# Patient Record
Sex: FEMALE | Race: WHITE | NOT HISPANIC OR LATINO | Employment: FULL TIME | URBAN - METROPOLITAN AREA
[De-identification: names, ages, dates, MRNs, and addresses within clinical notes are randomized per-mention and may not be internally consistent; named-entity substitution may affect disease eponyms.]

---

## 2017-06-02 ENCOUNTER — AMBULATORY - HEALTHEAST (OUTPATIENT)
Dept: LAB | Facility: HOSPITAL | Age: 32
End: 2017-06-02

## 2017-06-02 DIAGNOSIS — R53.83 FATIGUE: ICD-10-CM

## 2017-06-02 DIAGNOSIS — G25.81 RESTLESS LEGS: ICD-10-CM

## 2017-09-22 ENCOUNTER — OFFICE VISIT - HEALTHEAST (OUTPATIENT)
Dept: FAMILY MEDICINE | Facility: CLINIC | Age: 32
End: 2017-09-22

## 2017-09-22 DIAGNOSIS — L03.90 CELLULITIS: ICD-10-CM

## 2017-11-29 ENCOUNTER — AMBULATORY - HEALTHEAST (OUTPATIENT)
Dept: NURSING | Facility: CLINIC | Age: 32
End: 2017-11-29

## 2017-11-29 DIAGNOSIS — Z23 NEED FOR VACCINATION: ICD-10-CM

## 2017-12-13 ENCOUNTER — RECORDS - HEALTHEAST (OUTPATIENT)
Dept: ADMINISTRATIVE | Facility: OTHER | Age: 32
End: 2017-12-13

## 2017-12-18 ENCOUNTER — HOSPITAL ENCOUNTER (OUTPATIENT)
Dept: RADIOLOGY | Facility: CLINIC | Age: 32
Discharge: HOME | End: 2017-12-18

## 2017-12-18 DIAGNOSIS — N93.9 VAGINAL BLEEDING, ABNORMAL: ICD-10-CM

## 2017-12-18 DIAGNOSIS — N92.6 ABNORMAL BLEEDING IN MENSTRUAL CYCLE: ICD-10-CM

## 2018-02-28 ENCOUNTER — COMMUNICATION - HEALTHEAST (OUTPATIENT)
Dept: FAMILY MEDICINE | Facility: CLINIC | Age: 33
End: 2018-02-28

## 2018-03-01 ENCOUNTER — OFFICE VISIT - HEALTHEAST (OUTPATIENT)
Dept: FAMILY MEDICINE | Facility: CLINIC | Age: 33
End: 2018-03-01

## 2018-03-01 DIAGNOSIS — Z32.00 POSSIBLE PREGNANCY: ICD-10-CM

## 2018-03-01 LAB — HCG UR QL: POSITIVE

## 2018-03-02 ENCOUNTER — AMBULATORY - HEALTHEAST (OUTPATIENT)
Dept: LAB | Facility: HOSPITAL | Age: 33
End: 2018-03-02

## 2018-03-02 DIAGNOSIS — N89.8 VAGINAL DISCHARGE IN PREGNANCY, FIRST TRIMESTER: ICD-10-CM

## 2018-03-02 DIAGNOSIS — O26.891 VAGINAL DISCHARGE IN PREGNANCY, FIRST TRIMESTER: ICD-10-CM

## 2018-03-02 LAB
HCG SERPL-ACNC: 72 MLU/ML (ref 0–4)
PROGEST SERPL-MCNC: 33.3 NG/ML

## 2018-03-05 ENCOUNTER — AMBULATORY - HEALTHEAST (OUTPATIENT)
Dept: LAB | Facility: HOSPITAL | Age: 33
End: 2018-03-05

## 2018-03-05 DIAGNOSIS — O26.891 VAGINAL DISCHARGE IN PREGNANCY, FIRST TRIMESTER: ICD-10-CM

## 2018-03-05 DIAGNOSIS — N89.8 VAGINAL DISCHARGE IN PREGNANCY, FIRST TRIMESTER: ICD-10-CM

## 2018-03-05 LAB
HCG SERPL-ACNC: 209 MLU/ML (ref 0–4)
PROGEST SERPL-MCNC: 21.7 NG/ML

## 2018-03-19 ENCOUNTER — AMBULATORY - HEALTHEAST (OUTPATIENT)
Dept: LAB | Facility: HOSPITAL | Age: 33
End: 2018-03-19

## 2018-03-19 DIAGNOSIS — O26.891 VAGINAL DISCHARGE IN PREGNANCY, FIRST TRIMESTER: ICD-10-CM

## 2018-03-19 DIAGNOSIS — N89.8 VAGINAL DISCHARGE IN PREGNANCY, FIRST TRIMESTER: ICD-10-CM

## 2018-03-19 LAB — PROGEST SERPL-MCNC: 15.2 NG/ML

## 2018-03-23 ENCOUNTER — HOSPITAL ENCOUNTER (OUTPATIENT)
Dept: ULTRASOUND IMAGING | Facility: HOSPITAL | Age: 33
Discharge: HOME | End: 2018-03-23
Attending: FAMILY MEDICINE

## 2018-03-23 DIAGNOSIS — Z32.00 POSSIBLE PREGNANCY: ICD-10-CM

## 2018-03-29 ENCOUNTER — COMMUNICATION - HEALTHEAST (OUTPATIENT)
Dept: SCHEDULING | Facility: CLINIC | Age: 33
End: 2018-03-29

## 2018-04-03 ENCOUNTER — HOSPITAL ENCOUNTER (OUTPATIENT)
Dept: ULTRASOUND IMAGING | Facility: HOSPITAL | Age: 33
Discharge: HOME | End: 2018-04-03
Attending: FAMILY MEDICINE

## 2018-04-03 ENCOUNTER — RECORDS - HEALTHEAST (OUTPATIENT)
Dept: ADMINISTRATIVE | Facility: OTHER | Age: 33
End: 2018-04-03

## 2018-04-03 DIAGNOSIS — O20.9 VAGINAL BLEEDING IN PREGNANCY, FIRST TRIMESTER: ICD-10-CM

## 2018-09-08 ENCOUNTER — AMBULATORY - HEALTHEAST (OUTPATIENT)
Dept: LAB | Facility: HOSPITAL | Age: 33
End: 2018-09-08

## 2018-09-08 DIAGNOSIS — E28.8 OTHER OVARIAN DYSFUNCTION: ICD-10-CM

## 2018-09-08 LAB
ESTRADIOL SERPL-MCNC: 89 PG/ML
PROGEST SERPL-MCNC: 18 NG/ML

## 2018-09-29 ENCOUNTER — AMBULATORY - HEALTHEAST (OUTPATIENT)
Dept: LAB | Facility: HOSPITAL | Age: 33
End: 2018-09-29

## 2018-09-29 DIAGNOSIS — E28.2 POLYCYSTIC OVARIES: ICD-10-CM

## 2018-09-29 LAB — ESTRADIOL SERPL-MCNC: 87 PG/ML

## 2018-11-23 ENCOUNTER — AMBULATORY - HEALTHEAST (OUTPATIENT)
Dept: LAB | Facility: HOSPITAL | Age: 33
End: 2018-11-23

## 2018-11-23 DIAGNOSIS — Z87.59 HX OF ONE MISCARRIAGE: ICD-10-CM

## 2018-11-23 LAB — HCG SERPL-ACNC: 309 MLU/ML (ref 0–4)

## 2018-12-14 ENCOUNTER — RECORDS - HEALTHEAST (OUTPATIENT)
Dept: ADMINISTRATIVE | Facility: OTHER | Age: 33
End: 2018-12-14

## 2018-12-17 ENCOUNTER — AMBULATORY - HEALTHEAST (OUTPATIENT)
Dept: LAB | Facility: HOSPITAL | Age: 33
End: 2018-12-17

## 2018-12-17 DIAGNOSIS — Z87.59 HISTORY OF MISCARRIAGE: ICD-10-CM

## 2018-12-17 LAB
HCG SERPL-ACNC: ABNORMAL MLU/ML (ref 0–4)
PROGEST SERPL-MCNC: 31.4 NG/ML

## 2018-12-18 ENCOUNTER — HOSPITAL ENCOUNTER (OUTPATIENT)
Dept: ULTRASOUND IMAGING | Facility: HOSPITAL | Age: 33
Discharge: HOME | End: 2018-12-18
Attending: PHYSICIAN ASSISTANT

## 2018-12-18 DIAGNOSIS — O36.5990 SMALL FOR DATES AFFECTING MANAGEMENT OF MOTHER: ICD-10-CM

## 2018-12-18 DIAGNOSIS — O20.0 THREATENED MISCARRIAGE: ICD-10-CM

## 2019-02-25 ASSESSMENT — MIFFLIN-ST. JEOR: SCORE: 1605.33

## 2019-02-27 ENCOUNTER — SURGERY - HEALTHEAST (OUTPATIENT)
Dept: SURGERY | Facility: HOSPITAL | Age: 34
End: 2019-02-27

## 2019-02-27 ENCOUNTER — ANESTHESIA - HEALTHEAST (OUTPATIENT)
Dept: SURGERY | Facility: HOSPITAL | Age: 34
End: 2019-02-27

## 2019-08-16 ASSESSMENT — MIFFLIN-ST. JEOR: SCORE: 1600.8

## 2019-08-19 ENCOUNTER — SURGERY - HEALTHEAST (OUTPATIENT)
Dept: SURGERY | Facility: HOSPITAL | Age: 34
End: 2019-08-19

## 2019-08-19 ENCOUNTER — ANESTHESIA - HEALTHEAST (OUTPATIENT)
Dept: SURGERY | Facility: HOSPITAL | Age: 34
End: 2019-08-19

## 2020-10-12 ENCOUNTER — AMBULATORY - HEALTHEAST (OUTPATIENT)
Dept: LAB | Facility: HOSPITAL | Age: 35
End: 2020-10-12

## 2020-10-12 DIAGNOSIS — O26.891 VAGINAL DISCHARGE IN PREGNANCY, FIRST TRIMESTER: ICD-10-CM

## 2020-10-12 DIAGNOSIS — Z32.01 PREGNANCY EXAMINATION OR TEST, POSITIVE RESULT: ICD-10-CM

## 2020-10-12 DIAGNOSIS — N89.8 VAGINAL DISCHARGE IN PREGNANCY, FIRST TRIMESTER: ICD-10-CM

## 2020-10-12 LAB — HCG SERPL-ACNC: 1800 MLU/ML (ref 0–4)

## 2021-05-31 VITALS — WEIGHT: 184.7 LBS | BODY MASS INDEX: 28.08 KG/M2

## 2021-05-31 NOTE — ANESTHESIA CARE TRANSFER NOTE
Last vitals:   Vitals:    08/19/19 1319   BP: 117/64   Pulse: 79   Resp: 18   Temp: 37.1  C (98.7  F)   SpO2: 98%     Patient's level of consciousness is awake  Spontaneous respirations: yes  Maintains airway independently: yes  Dentition unchanged: yes  Oropharynx: oropharynx clear of all foreign objects    QCDR Measures:  ASA# 20 - Surgical Safety Checklist: WHO surgical safety checklist completed prior to induction    PQRS# 430 - Adult PONV Prevention: 4558F - Pt received => 2 anti-emetic agents (different classes) preop & intraop  ASA# 8 - Peds PONV Prevention: NA - Not pediatric patient, not GA or 2 or more risk factors NOT present  PQRS# 424 - Cher-op Temp Management: 4559F - At least one body temp DOCUMENTED => 35.5C or 95.9F within required timeframe  PQRS# 426 - PACU Transfer Protocol: - Transfer of care checklist used  ASA# 14 - Acute Post-op Pain: ASA14B - Patient did NOT experience pain >= 7 out of 10

## 2021-05-31 NOTE — ANESTHESIA POSTPROCEDURE EVALUATION
Patient: Mary Carlin  SUCTION CURETTAGE, UTERINE CULTURE  Anesthesia type: MAC    Patient location: P-8  Last vitals:   Vitals Value Taken Time   /63 8/19/2019  1:20 PM   Temp 37.1  C (98.7  F) 8/19/2019  1:19 PM   Pulse 73 8/19/2019  1:34 PM   Resp 14 8/19/2019  1:34 PM   SpO2 100 % 8/19/2019  1:34 PM   Vitals shown include unvalidated device data.  Post vital signs: stable  Level of consciousness: alert, conversing and drinking  Post-anesthesia pain: pain controlled  Post-anesthesia nausea and vomiting: no  Pulmonary: room air  Cardiovascular: stable and blood pressure at baseline  Hydration: adequate  Anesthetic events: no    QCDR Measures:  ASA# 11 - Cher-op Cardiac Arrest: ASA11B - Patient did NOT experience unanticipated cardiac arrest  ASA# 12 - Cher-op Mortality Rate: ASA12B - Patient did NOT die  ASA# 13 - PACU Re-Intubation Rate: ASA13B - Patient did NOT require a new airway mgmt  ASA# 10 - Composite Anes Safety: ASA10A - No serious adverse event    Additional Notes:

## 2021-05-31 NOTE — ANESTHESIA PREPROCEDURE EVALUATION
Anesthesia Evaluation      Patient summary reviewed   History of anesthetic complications     Airway   Mallampati: II  Neck ROM: full   Pulmonary - negative ROS and normal exam                          Cardiovascular - negative ROS and normal exam  Exercise tolerance: > or = 4 METS   Neuro/Psych    (+) depression, anxiety/panic attacks,     Endo/Other - negative ROS      GI/Hepatic/Renal - negative ROS      Other findings: Missed AB. Had post-op nausea after laparoscopic surgery.  Strong motion sickness hx.        Dental - normal exam                        Anesthesia Plan  Planned anesthetic: MAC    ASA 2     Anesthetic plan and risks discussed with: patient  Anesthesia plan special considerations: antiemetics,   Post-op plan: routine recovery

## 2021-06-01 VITALS — WEIGHT: 188.2 LBS | BODY MASS INDEX: 28.62 KG/M2

## 2021-06-02 VITALS — WEIGHT: 190 LBS | BODY MASS INDEX: 28.79 KG/M2 | HEIGHT: 68 IN

## 2021-06-03 ENCOUNTER — RECORDS - HEALTHEAST (OUTPATIENT)
Dept: ADMINISTRATIVE | Facility: OTHER | Age: 36
End: 2021-06-03

## 2021-06-03 VITALS — HEIGHT: 68 IN | BODY MASS INDEX: 28.64 KG/M2 | WEIGHT: 189 LBS

## 2021-06-05 ENCOUNTER — RECORDS - HEALTHEAST (OUTPATIENT)
Dept: FAMILY MEDICINE | Facility: CLINIC | Age: 36
End: 2021-06-05

## 2021-06-05 DIAGNOSIS — Z36.89 ENCOUNTER FOR FETAL ANATOMIC SURVEY: ICD-10-CM

## 2021-06-05 DIAGNOSIS — Z33.1 PREGNANT STATE, INCIDENTAL: ICD-10-CM

## 2021-06-08 ENCOUNTER — RECORDS - HEALTHEAST (OUTPATIENT)
Dept: ADMINISTRATIVE | Facility: OTHER | Age: 36
End: 2021-06-08

## 2021-06-10 ENCOUNTER — RECORDS - HEALTHEAST (OUTPATIENT)
Dept: ADMINISTRATIVE | Facility: OTHER | Age: 36
End: 2021-06-10

## 2021-06-11 ENCOUNTER — ANESTHESIA - HEALTHEAST (OUTPATIENT)
Dept: OBGYN | Facility: HOSPITAL | Age: 36
End: 2021-06-11

## 2021-06-13 NOTE — PROGRESS NOTES
Chief Complaint   Patient presents with     Facial Pain     rash/bump abover R eyebrow area getting larger and painful causing eye to hurt, 2nd area side of R face         History of Present Illness: Nursing notes reviewed. Patient first noted a tender area of swelling above hr right eyebrow about 5 days ago, with it starting to turn red in this area about 3 days ago. About 5 days ago, she also noted a small tender area near her right ear lobe. No recent injury or fever. No recent visual problems. She feels a little right nasal congestion.     Review of systems: See history of present illness, otherwise negative.     Current Outpatient Prescriptions   Medication Sig Dispense Refill     cholecalciferol, vitamin D3, (VITAMIN D3) 2,000 unit Tab Take by mouth.       cyanocobalamin (VITAMIN B-12) 500 MCG tablet Take 1,000 mcg by mouth daily.       escitalopram oxalate (LEXAPRO) 10 MG tablet Take 10 mg by mouth daily.       magnesium 30 mg tablet Take 30 mg by mouth 2 (two) times a day.       metFORMIN (GLUMETZA) 500 MG (MOD) 24 hr tablet Take 500 mg by mouth daily with breakfast.       NALTREXONE, BULK, MISC Use 3 mg As Directed.       PNV CMB#21/IRON/FOLIC ACID (PRENATAL COMPLETE ORAL) Take by mouth.       pyridoxine, vitamin B6, (VITAMIN B-6) 25 MG tablet Take 25 mg by mouth daily.       busPIRone (BUSPAR) 10 MG tablet Take 1 tablet (10 mg total) by mouth 2 (two) times a day. 60 tablet 2     cephalexin (KEFLEX) 500 MG capsule Take 1 capsule (500 mg total) by mouth 4 (four) times a day for 10 days. 40 capsule 0     sertraline (ZOLOFT) 100 MG tablet Take 1.5 tablets (150 mg total) by mouth daily. 135 tablet 3     No current facility-administered medications for this visit.        Past Medical History:   Diagnosis Date     Irregular menstrual cycle      Mental disorder     hx of anxiety prior to pregnancy. Off zoloft prior to pregnancy     Other and unspecified ovarian cyst      Other malaise and fatigue      Pregnant  state, incidental       Past Surgical History:   Procedure Laterality Date     TONSILLECTOMY       TONSILLECTOMY Bilateral     age 6      Social History     Social History     Marital status:      Spouse name: N/A     Number of children: N/A     Years of education: N/A     Social History Main Topics     Smoking status: Never Smoker     Smokeless tobacco: Never Used     Alcohol use No     Drug use: No     Sexual activity: Yes     Partners: Male     Other Topics Concern     None     Social History Narrative       History   Smoking Status     Never Smoker   Smokeless Tobacco     Never Used      Exam:   Blood pressure 112/62, pulse 77, temperature 98.7  F (37.1  C), resp. rate 12, weight 184 lb 11.2 oz (83.8 kg), SpO2 100 %, not currently breastfeeding.    EXAM:   General: Vital signs reviewed. Patient is in no acute appearing distress. Breathing is non labored appearing. Patient is alert and oriented x 3.   ENT: Tympanic membranes are clear and without injection bilaterally, nasal turbinates show no injection or rhinorrhea, no pharyngeal injection or exudate.  Just above patient's right eyebrow, patient has a tender firm 2 cm area of erythema, with no skin surface changes. Patient has a tender lymph node just anterior to the right ear lobe.  Neck: No adenopathy, with the exception of solitary lymph node just anterior to the right earlobe.    Assessment/Plan   1. Cellulitis  cephalexin (KEFLEX) 500 MG capsule       There are no Patient Instructions on file for this visit.   Dickson Raza DO

## 2021-06-16 PROBLEM — N96 RECURRENT PREGNANCY LOSS: Status: ACTIVE | Noted: 2021-06-11

## 2021-06-16 PROBLEM — Z3A.39 39 WEEKS GESTATION OF PREGNANCY: Status: ACTIVE | Noted: 2021-06-11

## 2021-06-16 PROBLEM — F32.A DEPRESSION: Status: ACTIVE | Noted: 2021-06-11

## 2021-06-16 NOTE — PROGRESS NOTES
Assessment & Plan:  1. Possible pregnancy  Pt with positive pregnancy test.  She was given folder and questions answered      Counseled patient on early pregnancy issues and plans for continued pregnancy:  - OTC meds safe in early pregnancy,  - importance of prenatal vitamins, and routine activities without restrictions.    - importance of ETOH abstinence and no other drug use  Informed of signs and symptoms of miscarriage and to call with questions of spotting/bleeding management and possible need to come in for evaluation before routine visit at 10-12 weeks.   Set 30 minute visit at 12 weeks pregnant.  Discussed information in the OB packet.  Will let us know if early genetic testing wanted.  Can call for script of Phenergan or Reglan if needed for nausea.  Early ultrasound if needed or wishes, can check with insurance coverage.        - Pregnancy, Urine  - US OB < 14 Weeks With Transvaginal; Future        Orders Placed This Encounter   Procedures     US OB < 14 Weeks With Transvaginal     Standing Status:   Future     Standing Expiration Date:   3/2/2019     Scheduling Instructions:      SCHEDULING: Will patient schedule appointment? Yes     LOCATION: Pt preference            LOCATIONS:        - [facility] ([facility]), PHONE: [phone]      - [facility] ([facility] [place]), PHONE: [phone]     Order Specific Question:   Reason for Exam (Describe Symptoms):     Answer:   dating us - unsure LMP - 1/14/19     Order Specific Question:   Is the patient pregnant?     Answer:   Yes     Order Specific Question:   Can the procedure be changed per Radiologist protocol?     Answer:   Yes     Pregnancy, Urine     Medications Discontinued During This Encounter   Medication Reason     busPIRone (BUSPAR) 10 MG tablet Therapy completed     sertraline (ZOLOFT) 100 MG tablet Therapy completed       No Follow-up on file.    Chief Complaint:   Chief Complaint   Patient presents with     Possible Pregnancy      missed period on 02/28/18-positive pregnancy test on monday 02/26/18       History of Present Illness:  Mary is a 32 y.o. female presenting to the clinic today for a pregnancy confirmation. This pregnancy is planned and desired, as her and her  have been trying for 8 months. She has had 6 positive at-home tests. She missed her period on 2/28/18, but has irregular cycles. Her last cycle was 1/14/18 and she tested positive test on 2/26/18. She did not have complications with her last pregnancy other than weight gain. She does not plan on genetic screening. She is taking prenatal vitamins. She has not been feeling nauseous, but feels dizzy in the evenings. She is taking lexipro. She has 6 sets of twins on her mom's side.     Review of Systems:  All other systems are negative.     PFSH:  She has one son, Agusto. She is a .     Tobacco Use:  History   Smoking Status     Never Smoker   Smokeless Tobacco     Never Used       Vitals:  Vitals:    03/01/18 1255   BP: 102/76   Patient Site: Right Arm   Patient Position: Sitting   Cuff Size: Adult Regular   Pulse: 88   Resp: 16   Temp: 98.3  F (36.8  C)   TempSrc: Oral   Weight: 188 lb 3.2 oz (85.4 kg)     Wt Readings from Last 3 Encounters:   03/01/18 188 lb 3.2 oz (85.4 kg)   09/22/17 184 lb 11.2 oz (83.8 kg)   05/13/16 194 lb 1.3 oz (88 kg)     Body mass index is 28.62 kg/(m^2).      Physical Exam:  Constitutional:  Reveals an alert, cooperative, pleasant female in no acute distress.  Vitals:  Per nursing notes.  Cardiac:  Regular rate and rhythm without murmurs, rubs, or gallops.   Lungs: Clear.  Respiratory effort normal.  Neurologic:  No gross focal deficits.    Psychiatric:  Mood appropriate, memory intact.     Data Reviewed:  Additional history summarized (from old records or history from someone other than the patient or another healthcare provider) (2 TOTAL): None.     Decision to obtain extra information (old records requested or history from  another person or accessing Care Everywhere) (1 TOTAL): None.     Radiology tests summarized or ordered (XRAY/CT/MRI/DXA) (1 TOTAL): Ordered US.     Labs reviewed or ordered (1 TOTAL): Reviewed and ordered labs.     Medicine tests summarized or ordered (EKG/ECHO) (1 TOTAL): None.    Independent review of EKG or X-Ray (2 EACH): None.       The visit lasted a total of 26 minutes face to face with the patient. Over 50% of the time was spent counseling and educating the patient about pregnancy.    I, Andressa Penaloza, am scribing for and in the presence of, Dr. Youngblood.    IShakira MD, personally performed the services described in this documentation, as scribed by Andressa Penaloza in my presence, and it is both accurate and complete.    Medications:  Current Outpatient Prescriptions   Medication Sig Dispense Refill     cholecalciferol, vitamin D3, (VITAMIN D3) 2,000 unit Tab Take by mouth.       escitalopram oxalate (LEXAPRO) 10 MG tablet Take 10 mg by mouth daily.       magnesium 30 mg tablet Take 30 mg by mouth 2 (two) times a day.       metFORMIN (GLUMETZA) 500 MG (MOD) 24 hr tablet Take 500 mg by mouth daily with breakfast.       PNV CMB#21/IRON/FOLIC ACID (PRENATAL COMPLETE ORAL) Take by mouth.       progesterone (PROMETRIUM) 200 MG capsule USE 2 CS VAGINALLY QHS FOR 10 DAYS EACH CYCLE. START PEAK PLUS 3  3     cyanocobalamin (VITAMIN B-12) 500 MCG tablet Take 1,000 mcg by mouth daily.       NALTREXONE, BULK, MISC Use 3 mg As Directed.       pyridoxine, vitamin B6, (VITAMIN B-6) 25 MG tablet Take 25 mg by mouth daily.       No current facility-administered medications for this visit.        Total Data Points: 2

## 2021-06-24 NOTE — ANESTHESIA POSTPROCEDURE EVALUATION
Patient: Mary Carlin  HYSTEROSCOPY, DILATATION AND CURETTAGE, UTERINE CULTURE, LAPAROSCOPY WITH TUBAL DYE STUDY. FULGURATION OF ENDOMETRIOSIS  Anesthesia type: general    Patient location: PACU  Last vitals:   Vitals:    02/27/19 1720   BP: 113/57   Pulse: 71   Resp: 10   Temp:    SpO2: 98%     Post vital signs: stable  Level of consciousness: awake and responds to simple questions  Post-anesthesia pain: pain controlled  Post-anesthesia nausea and vomiting: no  Pulmonary: unassisted, return to baseline  Cardiovascular: stable and blood pressure at baseline  Hydration: adequate  Anesthetic events: no    QCDR Measures:  ASA# 11 - Cher-op Cardiac Arrest: ASA11B - Patient did NOT experience unanticipated cardiac arrest  ASA# 12 - Cher-op Mortality Rate: ASA12B - Patient did NOT die  ASA# 13 - PACU Re-Intubation Rate: ASA13B - Patient did NOT require a new airway mgmt  ASA# 10 - Composite Anes Safety: ASA10A - No serious adverse event    Additional Notes:

## 2021-06-24 NOTE — ANESTHESIA PREPROCEDURE EVALUATION
Anesthesia Evaluation      Patient summary reviewed     Airway   Mallampati: II  Neck ROM: full   Pulmonary - negative ROS and normal exam                          Cardiovascular - negative ROS  Rhythm: regular  Rate: normal,         Neuro/Psych      Endo/Other - negative ROS      GI/Hepatic/Renal - negative ROS           Dental - normal exam                        Anesthesia Plan  Planned anesthetic: general endotracheal  Tiva, ketamine  ASA 1   Induction: intravenous       Post-op plan: routine recovery

## 2021-06-24 NOTE — ANESTHESIA CARE TRANSFER NOTE
Last vitals:   Vitals:    02/27/19 1650   BP: 113/60   Pulse: (!) 101   Resp: 18   Temp: 37.2  C (98.9  F)   SpO2: 100%     Patient's level of consciousness is drowsy  Spontaneous respirations: yes  Maintains airway independently: yes  Dentition unchanged: yes  Oropharynx: oropharynx clear of all foreign objects    QCDR Measures:  ASA# 20 - Surgical Safety Checklist: WHO surgical safety checklist completed prior to induction    PQRS# 430 - Adult PONV Prevention: 4558F - Pt received => 2 anti-emetic agents (different classes) preop & intraop  ASA# 8 - Peds PONV Prevention: NA - Not pediatric patient, not GA or 2 or more risk factors NOT present  PQRS# 424 - Cher-op Temp Management: 4559F - At least one body temp DOCUMENTED => 35.5C or 95.9F within required timeframe  PQRS# 426 - PACU Transfer Protocol: - Transfer of care checklist used  ASA# 14 - Acute Post-op Pain: ASA14B - Patient did NOT experience pain >= 7 out of 10

## 2021-06-26 ENCOUNTER — HEALTH MAINTENANCE LETTER (OUTPATIENT)
Age: 36
End: 2021-06-26

## 2021-06-26 NOTE — ANESTHESIA PREPROCEDURE EVALUATION
Anesthesia Evaluation      Patient summary reviewed   No history of anesthetic complications     Airway   Mallampati: II   Pulmonary - negative ROS and normal exam                          Cardiovascular - negative ROS and normal exam  Rhythm: regular  Rate: normal,         Neuro/Psych - negative ROS     Endo/Other - negative ROS   (+) pregnant     GI/Hepatic/Renal - negative ROS           Dental - normal exam                        Anesthesia Plan  Planned anesthetic: epidural    ASA 2     Anesthetic plan and risks discussed with: patient and spouse    Post-op plan: routine recovery

## 2021-06-26 NOTE — ANESTHESIA PROCEDURE NOTES
Epidural Block    Patient location during procedure: OB  Time Called: 6/11/2021 6:08 PM  Reason for Block:labor epidural  Staffing:  Performing  Anesthesiologist: Colin Richmond MD  Preanesthetic Checklist  Completed: patient identified, risks, benefits, and alternatives discussed, timeout performed, consent obtained, at patient's request, airway assessed, oxygen available, suction available, emergency drugs available and hand hygiene performed  Procedure  Patient position: sitting  Prep: ChloraPrep  Patient monitoring: continuous pulse oximetry  Approach: midline  Location: L3-L4  Injection technique: ALEKSANDER saline  Number of Attempts:1  Needle  Needle type: Lianna   Needle gauge: 18 G     Catheter in Space: 5  Assessment  Sensory level: T8  No complications      Additional Notes:  Called to patient's room for epidural  Consent obtained  Timeput performed  RN at bedside during procedure

## 2021-06-26 NOTE — ANESTHESIA POSTPROCEDURE EVALUATION
"Patient: Mary Carlin  * No procedures listed *  Anesthesia type: epidural    Patient location: Labor and Delivery  Last vitals: No vitals data found for the desired time range.    Post vital signs: stable  Level of consciousness: awake and responds to simple questions  Post-anesthesia pain: pain controlled  Post-anesthesia nausea and vomiting: no  Pulmonary: unassisted, return to baseline  Cardiovascular: stable and blood pressure at baseline  Hydration: adequate  Anesthetic events: no    QCDR Measures:  ASA# 11 - Cher-op Cardiac Arrest: ASA11B - Patient did NOT experience unanticipated cardiac arrest  ASA# 12 - Cher-op Mortality Rate: ASA12B - Patient did NOT die  ASA# 13 - PACU Re-Intubation Rate: ASA13B - Patient did NOT require a new airway mgmt  ASA# 10 - Composite Anes Safety: ASA10A - No serious adverse event    Additional Notes:Mary Carlin  The patient is status post epidural placement.  The effects of the epidural have worn off.  The patient has no headache, and no back pain.  There is no need for follow up.  /78 (Patient Position: Semi-sosa, Cuff Size: Adult Regular)   Pulse 93   Temp 36.7  C (98.1  F) (Oral)   Resp 18   Ht 5' 8\" (1.727 m)   Wt (!) 235 lb (106.6 kg)   SpO2 98%   Breastfeeding Unknown   BMI 35.73 kg/m    "

## 2021-11-02 ENCOUNTER — THERAPY VISIT (OUTPATIENT)
Dept: PHYSICAL THERAPY | Facility: CLINIC | Age: 36
End: 2021-11-02
Payer: COMMERCIAL

## 2021-11-02 DIAGNOSIS — R10.2 PELVIC PAIN IN FEMALE: ICD-10-CM

## 2021-11-02 DIAGNOSIS — R39.15 URINARY URGENCY: ICD-10-CM

## 2021-11-02 DIAGNOSIS — R15.9 INCONTINENCE OF FECES, UNSPECIFIED FECAL INCONTINENCE TYPE: Primary | ICD-10-CM

## 2021-11-02 DIAGNOSIS — N81.9 PROLAPSE OF FEMALE PELVIC ORGANS: ICD-10-CM

## 2021-11-02 PROCEDURE — 97161 PT EVAL LOW COMPLEX 20 MIN: CPT | Mod: GP | Performed by: PHYSICAL THERAPIST

## 2021-11-02 PROCEDURE — 97140 MANUAL THERAPY 1/> REGIONS: CPT | Mod: GP | Performed by: PHYSICAL THERAPIST

## 2021-11-02 PROCEDURE — 97112 NEUROMUSCULAR REEDUCATION: CPT | Mod: GP | Performed by: PHYSICAL THERAPIST

## 2021-11-02 PROCEDURE — 97110 THERAPEUTIC EXERCISES: CPT | Mod: GP | Performed by: PHYSICAL THERAPIST

## 2021-11-02 NOTE — PROGRESS NOTES
"Physical Therapy Initial Evaluation  Subjective:  The history is provided by the patient.   Patient Health History  Mary Carlin being seen for pelvic floor issues.     Problem began: 7/12/2021.   Problem occurred: pregnancy and birth   Pain is reported as 0/10 on pain scale.  General health as reported by patient is good.  Pertinent medical history includes: changes in bowel/bladder.     Medical allergies: other. Other medical allergies details: see chart.   Surgeries include:  Other. Other surgery history details: exploratory lap for endo x2..    Current medications:  Anti-depressants and sleep medication.    Current occupation is .   Primary job tasks include:  Computer work and prolonged sitting.                  Therapist Generated HPI Evaluation  Problem details: After 1st delivery 6 ago:   Has felt incomplete emptying of bowel and bladder. MD at the time didn't do any intervention.   Bladder: had to lean forward to empty. Then stand again and then sit again to get more out. Occurred mostly every void.   Denied urinary urge or leaking, even LEOBARDO.   BMs: had to support her perineum to get a BM. Would have to keep wiping and still didn't feel clean.     Since 2nd delivery 5 months:  Feels incomplete emptying of bowel and bladder are worse.   Bladder: does have urge now, denies UI. Estimates 1-2x/day.  BMs: 1-2x per week. Constipation has always been her \"normal\". Before children she used to only have BMs 1x in 10-14 days.   FI: a few times per month. Usually with urgency. Stools are formed.     She has had 2 periods and has noticed it's hard to keep a tampon in.     Has had 2 exploratory surgeries for endometriosis and removal of endo with both. Also had hystoscopy.  2/2019 5/2020    Rectal pain: about 1x/month. Feels like a sharp stabbing right up anus. Lasts only a couple seconds but may occur a few times.   Her OB/GYN thinks her rectal pain is related to weak PFM.     Hx of 3 miscarriages " between two deliveries.     Pain with intercourse: yes with deep penetration. It has always been painful and has been getting worse.   Since 2nd delivery, pain is about 4/10 with deep penetration. Will often have soreness the next day. Will also have some pelvic discomfort the day after intercourse.     Likely has CINDY.     Gets lower abdominal pain with long walks, after about 20 minutes. Gets back pain about 10 min into the walk.   Pelvic pain: goes into lower back and into thighs. Will last the rest of the day. Says it is achy.   Will feel a falling out feeling with long walking. .                                                        Objective:  System    Physical Exam    General     ROS    SUBJECTIVE:  Patient reports onset of symptoms 6 years ago but worsening with 2nd delivery 5 months ago.Symptoms include FI, constipation, rectal/anal pain, pain with intercouse, . Since onset symptoms have been getting better, worse or staying the same? worse.      Verbal permission from patient to do internal pelvis muscle assessment? yes Verbal permission to complete external perineal assessment? yes Would you like someone else in the room as a chaperone? no      Urination:  Do you leak on the way to the bathroom or with a strong urge to void? No   Do you leak with cough,sneeze, jumping, running?No   Any other activities that cause leaking? No   Do you have triggers that make you feel you can't wait to go to the bathroom? yes what are they rolling over in bed, shopping.  Type of pad and number used per day? na  When you leak what is the amount? na  How long can you delay the need to urinate? 1 - 2 minutes.   How many times do you get up to urinate at night? 1-3, urge with rolling over   Can you stop the flow of urine when on the toilet? Yes  Is the volume of urine passed usually: average. (8sec rule= 250ml with average bladder storing 400-600ml)  Do you feel empty when you are done? no  Do you strain to pass urine? No  Do  you have a slow or hesitant urinary stream? Yes, some start/stopping  Do you have difficulty initiating the urine stream? No  Is urination painful? Not asked  How many bladder infections have you had in last 12 months? 0  Fluid intake(one glass is 8oz or one cup) 6-8 glasses/day, 3 caffinated glasses/day  0-1 alcohol glasses/day.    Bowel habits:  Frequency of bowel movements? 2 times a week  Consistancy of stool? soft, soft formed, [place] Stool Scale not asked  Do you ignore the urge to defecate? No  Do you strain to pass stool? Yes    Aggrevating factors:  Is loss of stool associated with an activity (lifting, coughing, running) or a food)  Not asked  Are there any foods that increase or decrease your symptoms?  Not asked  Do you have any food allergies?  Not asked      Sensation:   Can you tell if there is solid, liquid, or gas in the rectum?  Not asked  Do you feel the urge to move your bowels?  Yes  Is the urge very strong and difficult to control? sometimes Or weak/absent?   Do you feel the rectum is empty with you finish a bowel movement?   No    Do you have abdominal pain?  Yes  Describe the quality of the pain: achy  What makes your abdominal pain worse? Activity like long walking, sometimes after intercourse  What makes your abdominal pain better? Avoiding activity  Do you ever have pain that wakes you at night? Didn't ask    Do you have rectal pain, pressure, or burning?  Yes, strong and sharp pains up the anus/rectum that only last a few seconds but repeat in a short time. This happens about 1x/month and has been going on for years.     Pelvic Pain:  Do you have any pelvic pain with intercourse, exams, use of tampons? Yes, has always had pain with deep penetration and it has been worsening.  Is initial penetration during intercourse painful? No  Is deeper penetration painful? Yes  Do you use lubricant? Not asked What kind?   ?  Given birth? Yes Any complications? No  # of vaginal delieveries? 2  # of  C-sections? 0  # of episiotomies? 2 second degree tears.  Are you sexually active?Yes  Have you ever been worried for your physical safety? Not asked    Do you have any depression, anxiety, panic attacks, excessive   stress?  Not asked  Any abdominal or pelvic surgeries? Laproscopies, exploratory for endometriosis x2  Are you having any regular exercise? Walking, stretching  Have you practiced the PF(kegel) exercises for 4 or more weeks? no  Thyroid checked? Didn't ask(related to hair loss, flu-like symptoms, wt gain/loss, fatigue, menopause)  Changed diet lately? Didn't ask    OBSERVATION  Lumbar Posture: Negative  Pelvic symmetry: Negative  Introitus: unremarkable  Trendelenberg Sign:Not Tested: Time constraints    ROM  Single leg standing unilateral hip flexion PSIS:Not Tested: Time constraints  Standing forward flexion PSIS:Negative  Passive Hip ROM:Not Tested: Time constraints  [place]:Positive for lumbar pain on L, good ROM B [place]  [place] with OP:Not Tested: Time constraints    MUSCLE PERFORMANCE    Baseline PF tone:hypo  PF Tone with cough: not tested  Valsalva: poor bulge, plan to retest  PF Response quality: normal  PF Power: Center: 3 Stronger on right/left - symmetry.  Endurance: Maximum contraction in seconds: 6  Some overuse of abdominals during contraction    Prolapse: Cyctocele/Rectocele/Uterine grade: poor test since valsalva was poor      PALPATION: Pain: levator ani at 3:00, 4:00, 5:00, 7:00    CINDY assessment:   At umbilicus 1.5 fingers  Above 1 finger  Below: 0 fingers        Assessment/Plan:    Patient is a 36 year old female with complaints of pelvic dysfunction.    Patient has the following significant findings with corresponding treatment plan.                Diagnosis 1:  Urinary urgency  Decreased strength - therapeutic exercise, therapeutic activities and home program  Impaired muscle performance - biofeedback, electric stimulation, neuro re-education and home program  Decreased function -  therapeutic activities and home program  Diagnosis 2:  Constipation and fecal incontinence   Decreased ROM/flexibility - manual therapy, therapeutic exercise, therapeutic activity and home program  Decreased strength - therapeutic exercise, therapeutic activities and home program  Impaired muscle performance - biofeedback, electric stimulation, neuro re-education and home program  Decreased function - therapeutic activities and home program  Diagnosis 3:  Pelvic pain  Pain -  electric stimulation, manual therapy, self management, education and home program  Decreased ROM/flexibility - manual therapy, therapeutic exercise, therapeutic activity and home program  Decreased strength - therapeutic exercise, therapeutic activities and home program  Impaired muscle performance - biofeedback, electric stimulation, neuro re-education and home program  Decreased function - therapeutic activities and home program  Impaired posture - neuro re-education and home program     Therapy Evaluation Codes:   1) History comprised of:   Personal factors that impact the plan of care:      None.    Comorbidity factors that impact the plan of care are:      None.     Medications impacting care: None.  2) Examination of Body Systems comprised of:   Body structures and functions that impact the plan of care:      Hip, Lumbar spine, Pelvis and Sacral illiac joint.   Activity limitations that impact the plan of care are:      Lifting, Standing, Walking, Working, Sleeping, Fecal incontinence, Frequency, [place] and Urgency.  3) Clinical presentation characteristics are:   Stable/Uncomplicated.  4) Decision-Making    Low complexity using standardized patient assessment instrument and/or measureable assessment of functional outcome.  Cumulative Therapy Evaluation is: Low complexity.    Previous and current functional limitations:  (See Goal Flow Sheet for this information)    Short term and Long term goals: (See Goal Flow Sheet for this  information)     Communication ability:  Patient appears to be able to clearly communicate and understand verbal and written communication and follow directions correctly.  Treatment Explanation - The following has been discussed with the patient:   RX ordered/plan of care  Anticipated outcomes  Possible risks and side effects  This patient would benefit from PT intervention to resume normal activities.   Rehab potential is good.    Frequency:  1 X week, once daily  Duration:  for 8 weeks  Discharge Plan:  Achieve all LTG.  Independent in home treatment program.  Reach maximal therapeutic benefit.    Please refer to the daily flowsheet for treatment today, total treatment time and time spent performing 1:1 timed codes.

## 2021-11-09 ENCOUNTER — THERAPY VISIT (OUTPATIENT)
Dept: PHYSICAL THERAPY | Facility: CLINIC | Age: 36
End: 2021-11-09
Payer: COMMERCIAL

## 2021-11-09 DIAGNOSIS — R15.9 FECAL INCONTINENCE: Primary | ICD-10-CM

## 2021-11-09 DIAGNOSIS — M62.89 PELVIC FLOOR TENSION: ICD-10-CM

## 2021-11-09 DIAGNOSIS — N81.9 PROLAPSE OF FEMALE PELVIC ORGANS: ICD-10-CM

## 2021-11-09 PROCEDURE — 97112 NEUROMUSCULAR REEDUCATION: CPT | Mod: GP | Performed by: PHYSICAL THERAPIST

## 2021-11-09 PROCEDURE — 97140 MANUAL THERAPY 1/> REGIONS: CPT | Mod: GP | Performed by: PHYSICAL THERAPIST

## 2021-11-09 PROCEDURE — 97535 SELF CARE MNGMENT TRAINING: CPT | Mod: GP | Performed by: PHYSICAL THERAPIST

## 2021-11-23 ENCOUNTER — THERAPY VISIT (OUTPATIENT)
Dept: PHYSICAL THERAPY | Facility: CLINIC | Age: 36
End: 2021-11-23
Payer: COMMERCIAL

## 2021-11-23 DIAGNOSIS — R15.9 FECAL INCONTINENCE: ICD-10-CM

## 2021-11-23 DIAGNOSIS — N81.9 PROLAPSE OF FEMALE PELVIC ORGANS: ICD-10-CM

## 2021-11-23 PROCEDURE — 97112 NEUROMUSCULAR REEDUCATION: CPT | Mod: GP | Performed by: PHYSICAL THERAPIST

## 2021-11-23 PROCEDURE — 97110 THERAPEUTIC EXERCISES: CPT | Mod: GP | Performed by: PHYSICAL THERAPIST

## 2021-12-02 ENCOUNTER — THERAPY VISIT (OUTPATIENT)
Dept: PHYSICAL THERAPY | Facility: CLINIC | Age: 36
End: 2021-12-02
Payer: COMMERCIAL

## 2021-12-02 DIAGNOSIS — R15.9 FECAL INCONTINENCE: ICD-10-CM

## 2021-12-02 DIAGNOSIS — N81.9 PROLAPSE OF FEMALE PELVIC ORGANS: ICD-10-CM

## 2021-12-02 PROCEDURE — 97112 NEUROMUSCULAR REEDUCATION: CPT | Mod: GP | Performed by: PHYSICAL THERAPIST

## 2021-12-02 PROCEDURE — 97110 THERAPEUTIC EXERCISES: CPT | Mod: GP | Performed by: PHYSICAL THERAPIST

## 2021-12-06 ENCOUNTER — THERAPY VISIT (OUTPATIENT)
Dept: PHYSICAL THERAPY | Facility: CLINIC | Age: 36
End: 2021-12-06
Payer: COMMERCIAL

## 2021-12-06 DIAGNOSIS — R15.9 FECAL INCONTINENCE: ICD-10-CM

## 2021-12-06 DIAGNOSIS — N81.9 PROLAPSE OF FEMALE PELVIC ORGANS: ICD-10-CM

## 2021-12-06 PROCEDURE — 97110 THERAPEUTIC EXERCISES: CPT | Mod: GP | Performed by: PHYSICAL THERAPIST

## 2021-12-06 PROCEDURE — 97112 NEUROMUSCULAR REEDUCATION: CPT | Mod: GP | Performed by: PHYSICAL THERAPIST

## 2022-01-06 ENCOUNTER — THERAPY VISIT (OUTPATIENT)
Dept: PHYSICAL THERAPY | Facility: CLINIC | Age: 37
End: 2022-01-06
Payer: COMMERCIAL

## 2022-01-06 DIAGNOSIS — N81.9 PROLAPSE OF FEMALE PELVIC ORGANS: ICD-10-CM

## 2022-01-06 DIAGNOSIS — R15.9 FECAL INCONTINENCE: ICD-10-CM

## 2022-01-06 PROCEDURE — 97110 THERAPEUTIC EXERCISES: CPT | Mod: GP | Performed by: PHYSICAL THERAPIST

## 2022-01-06 PROCEDURE — 97112 NEUROMUSCULAR REEDUCATION: CPT | Mod: GP | Performed by: PHYSICAL THERAPIST

## 2022-01-06 NOTE — PROGRESS NOTES
Subjective:  HPI  Physical Exam                    Objective:  System    Physical Exam    General     ROS    Assessment/Plan:    PROGRESS  REPORT    Progress reporting period is from 11/16/21 to 1/6/21.       SUBJECTIVE  Subjective changes noted by patient: Patient reports that she's about 25% better since starting PT. Patient hasn't been in for a month due to everyone in her house being sick. No COVID. In terms of her sx's, feels things are getting better however didn't have a lot of time to do her exercises. Did have her period and didn't have as much trouble with the tampons falling out. Feels she's emptying her bowels more completely. Still has to bend forward a bit to empty but hasn't had to stand and sit back down right away and urgency isn't as bad.      .   Changes in function:  Yes (See Goal flowsheet attached for changes in current functional level)  Adverse reaction to treatment or activity: None    OBJECTIVE  Changes noted in objective findings:  Yes, Pt did not want internal exam today or to be hooked up to biofeedback today as she brought her infant with her. Was able to progress exercises today without significant difficulty. Does note she's able to contract and relax her PF but still unable to sustain it for >3-5 seconds before fatigue and adding roll ins and/or roll outs is difficult for her. Tolerated progression of core exercises without difficulty        ASSESSMENT/PLAN  Updated problem list and treatment plan: Diagnosis 1:  Constipation and fecal incontinence  Decreased strength - therapeutic exercise, therapeutic activities and home program  Impaired muscle performance - biofeedback, neuro re-education and home program  Decreased function - therapeutic activities and home program  STG/LTGs have been met or progress has been made towards goals:  Yes (See Goal flow sheet completed today.)  Assessment of Progress: The patient's condition is improving.  The patient's condition has potential to  improve.  Self Management Plans:  Patient has been instructed in a home treatment program.  Patient  has been instructed in self management of symptoms.  I have re-evaluated this patient and find that the nature, scope, duration and intensity of the therapy is appropriate for the medical condition of the patient.  Mary continues to require the following intervention to meet STG and LTG's:  PT    Recommendations:  This patient would benefit from continued therapy.     Frequency:  1 X week, once daily  Duration:  for 4-6 weeks        Please refer to the daily flowsheet for treatment today, total treatment time and time spent performing 1:1 timed codes.

## 2022-01-27 ENCOUNTER — THERAPY VISIT (OUTPATIENT)
Dept: PHYSICAL THERAPY | Facility: CLINIC | Age: 37
End: 2022-01-27
Payer: COMMERCIAL

## 2022-01-27 DIAGNOSIS — R15.9 FECAL INCONTINENCE: ICD-10-CM

## 2022-01-27 DIAGNOSIS — N81.9 PROLAPSE OF FEMALE PELVIC ORGANS: ICD-10-CM

## 2022-01-27 PROCEDURE — 97530 THERAPEUTIC ACTIVITIES: CPT | Mod: GP | Performed by: PHYSICAL THERAPIST

## 2022-01-27 PROCEDURE — 97112 NEUROMUSCULAR REEDUCATION: CPT | Mod: GP | Performed by: PHYSICAL THERAPIST

## 2022-01-27 PROCEDURE — 97110 THERAPEUTIC EXERCISES: CPT | Mod: GP | Performed by: PHYSICAL THERAPIST

## 2022-02-28 ENCOUNTER — THERAPY VISIT (OUTPATIENT)
Dept: PHYSICAL THERAPY | Facility: CLINIC | Age: 37
End: 2022-02-28
Payer: COMMERCIAL

## 2022-02-28 DIAGNOSIS — N81.9 PROLAPSE OF FEMALE PELVIC ORGANS: ICD-10-CM

## 2022-02-28 DIAGNOSIS — R15.9 FECAL INCONTINENCE: ICD-10-CM

## 2022-02-28 PROCEDURE — 97110 THERAPEUTIC EXERCISES: CPT | Mod: GP | Performed by: PHYSICAL THERAPIST

## 2022-02-28 PROCEDURE — 97112 NEUROMUSCULAR REEDUCATION: CPT | Mod: GP | Performed by: PHYSICAL THERAPIST

## 2022-02-28 NOTE — PROGRESS NOTES
Subjective:  HPI  Physical Exam                    Objective:  System    Physical Exam    General     ROS    Assessment/Plan:    PROGRESS  REPORT    Progress reporting period is from 1/6/22 to 2/28/22.       SUBJECTIVE  Subjective changes noted by patient:  Patient reports that she has added mag to her diet and has noted she's had more bowel movements in a day and if she does, feels like she's falling out down there. Does feel she has to strain to go to the bathroom and has to hold her perineum to go to the bathroom. Feels like she's emptying better however, wipes clean and notes that when she goes to the bathroom later, there is still stool there. It does seem to be better but it's still happening. Is able to hold her bladder a lot longer and hasn't had any urgency situations like she did before. Going to the bathroom during the day, she's going a normal amount for the amount of liquid she's drinking. Has been working on her exercises which is better than before but not as consistent as they should be.      Changes in function:  Yes (See Goal flowsheet attached for changes in current functional level)  Adverse reaction to treatment or activity: None    OBJECTIVE  Changes noted in objective findings:  Yes, Pt hooked up to biofeedback which revealed resting tone of 2.5 mV. Able to contract up to 12 mV consistently. Did require some cuing to avoid overuse of gluts and thighs in sitting. With holds, unable to sustain the contraction without 50% drop in intensity. Able to tolerate higher level abdominal exercises however fatigued quickly.        ASSESSMENT/PLAN  Updated problem list and treatment plan: Diagnosis 1:  Incontinence and pelvic floor weakness  Decreased ROM/flexibility - manual therapy, therapeutic exercise, therapeutic activity and home program  Decreased strength - therapeutic exercise, therapeutic activities and home program  Impaired muscle performance - biofeedback, neuro re-education and home  program  Decreased function - therapeutic activities and home program  STG/LTGs have been met or progress has been made towards goals:  Yes (See Goal flow sheet completed today.)  Assessment of Progress: The patient's condition is improving.  The patient's condition has potential to improve.  Self Management Plans:  Patient has been instructed in a home treatment program.  I have re-evaluated this patient and find that the nature, scope, duration and intensity of the therapy is appropriate for the medical condition of the patient.  Mary continues to require the following intervention to meet STG and LTG's:  PT    Recommendations:  This patient would benefit from continued therapy.     Frequency:  1-2 X a month, once daily  Duration:  for 2-4 months        Please refer to the daily flowsheet for treatment today, total treatment time and time spent performing 1:1 timed codes.

## 2022-07-23 ENCOUNTER — HEALTH MAINTENANCE LETTER (OUTPATIENT)
Age: 37
End: 2022-07-23

## 2022-09-26 PROBLEM — N81.9 PROLAPSE OF FEMALE PELVIC ORGANS: Status: RESOLVED | Noted: 2021-11-02 | Resolved: 2022-09-26

## 2022-09-26 PROBLEM — R15.9 FECAL INCONTINENCE: Status: RESOLVED | Noted: 2021-11-02 | Resolved: 2022-09-26

## 2022-10-01 ENCOUNTER — HEALTH MAINTENANCE LETTER (OUTPATIENT)
Age: 37
End: 2022-10-01

## 2022-11-14 DIAGNOSIS — N89.8 VAGINAL ODOR: Primary | ICD-10-CM

## 2022-11-14 DIAGNOSIS — L29.2 VULVAR PRURITUS: ICD-10-CM

## 2022-12-06 NOTE — H&P
12/2/2022 Mary Carlin 1985  HPI: The patient presents for abnormal uterine bleeding and PCOS.  She also has a history of recurrent pregnancy loss (see OB history).  She underwent treatment at the [facility] for the recurrent pregnancy loss and was able to achieve a term pregnancy; she returns for ovarian wedge resection per their recommendation.  She states she has heavy menses on cycle day 2 and 3 where she has to change her pad every 2 hours and leaks onto her sheets during the nighttime.  There have been cycles where did not leave the house due to the severity of bleeding.  She states that she had uterine perforation during multiple surgeries in the past.    OB history:   vaginal delivery in 2015 with a healthy boy  miscarriage at 10 weeks and 5 days in 4/2018 with a positive fetal heart rate previously seen on ultrasound  miscarriage at 10 weeks in 12/2018 with a positive fetal heart rate previously seen on ultrasound  miscarriage at 10 weeks in 8/2019 with a positive fetal heart rate seen on ultrasound which required a D&C  vaginal delivery in 6/2021    GYN history: Denies history of abnormal Pap smears or STDs  Medical history: ADHD, PCOS, endometriosis, uterine fibroid, anxiety, chronic endometritis  Surgical history: Tonsillectomy, D&C for miscarriage 8/2019 2/2019 laparoscopy with tubal dye study and repair of uterine perforation and fulguration of endometriosis with hysteroscopy D&C  5/2020 laparoscopic laser vaporization of endometriosis and lysis of adhesions, hysteroscopy, selective hysterosalpingogram with right fallopian tube recannulization, endometrial and endocervical cultures and biopsies   Family history: Denies history of bleeding or clotting disorders  Social history: Denies use of tobacco or drugs, denies use of alcohol  Medications: Magnesium, vitamin D, Cymbalta, prenatal vitamin, iron, B12  Allergies: Chlorhexidine (hives)    Objective:   Vitals: Blood pressure  108/75, temp 98.2, pulse 93, weight 201, BMI 30.5  Physical Exam:  General: No acute distress  HEENT: No thyromegaly or lymphadenopathy palpated  Lungs: CTAB  Cardiac: RRR, no M/R/G auscultated  Abdominal: no pain or organomegaly with palpation  Extremities: no calf pain or edema    Labs: Mild insulin resistance, FSH: LH reversal    Ultrasound 6/13/22:  Uterus 6.90 x 4.09 x 5.34 cm, endometrial thickness 0.72 cm, 1.08 cm submucosal fibroid  Left ovary 2.8 x 2.30 x 2.65 cm, volume 9.19 mL, 36-38 follicles less than 10 mm  Right ovary 3.71 x 2.54 x 3.46 cm, volume 17.06 mL, 32-35 follicles less than 10 mm    Assessment/plan :   We extensively discussed the procedure of endocervical and endometrial cultures and biopsies, bilateral selective hysterosalpingogram, hysteroscopic myomectomy, robotic guided carbon dioxide laser bilateral ovarian wedge resection, excision of endometriosis, lysis of adhesions, adhesion prevention measures, possible bilateral fallopian tube recanalization, possible laparoscopic appendectomy, possible placement of [place]-Wil[place] membrane.    We discussed the risks and benefits of surgery including but not limited to the risks of blood loss (the patient consents to blood transfusion if needed), infection (skin or pelvic), injury to other organs (uterus, fallopian tubes, ovaries, bladder, bowel, ureters, nerves, vessels), hernia, risks of anesthesia (DVT, pulmonary embolism, pneumonia, death).     We discussed the possibility of endometriosis present on organs which would require the assistance of general surgery including the diaphragm and small bowel, if this is found, the patient was given the options of considering this a diagnostic surgery and rescheduling with general surgery at a later time, or doing my part of the surgery and leaving the endometriosis in those areas.  The patient would prefer that I do my part of the surgery.  She understands if [place]-Wilman needs to be placed, it would need to be  removed at a later time with an additional laparoscopy. The patient expressed understanding of the above, all of her questions were answered, and she desires to proceed to surgery.    Pippa Alatorre MD

## 2022-12-07 ENCOUNTER — ANESTHESIA EVENT (OUTPATIENT)
Dept: SURGERY | Facility: HOSPITAL | Age: 37
End: 2022-12-07
Payer: COMMERCIAL

## 2022-12-08 ENCOUNTER — APPOINTMENT (OUTPATIENT)
Dept: RADIOLOGY | Facility: HOSPITAL | Age: 37
End: 2022-12-08
Attending: OBSTETRICS & GYNECOLOGY
Payer: COMMERCIAL

## 2022-12-08 ENCOUNTER — ANESTHESIA (OUTPATIENT)
Dept: SURGERY | Facility: HOSPITAL | Age: 37
End: 2022-12-08
Payer: COMMERCIAL

## 2022-12-08 ENCOUNTER — HOSPITAL ENCOUNTER (OUTPATIENT)
Facility: HOSPITAL | Age: 37
Discharge: HOME | End: 2022-12-08
Attending: OBSTETRICS & GYNECOLOGY | Admitting: OBSTETRICS & GYNECOLOGY
Payer: COMMERCIAL

## 2022-12-08 VITALS
WEIGHT: 207.2 LBS | DIASTOLIC BLOOD PRESSURE: 62 MMHG | TEMPERATURE: 97 F | BODY MASS INDEX: 31.5 KG/M2 | SYSTOLIC BLOOD PRESSURE: 114 MMHG | RESPIRATION RATE: 18 BRPM | HEART RATE: 108 BPM | OXYGEN SATURATION: 97 %

## 2022-12-08 DIAGNOSIS — G89.18 POST-OP PAIN: Primary | ICD-10-CM

## 2022-12-08 DIAGNOSIS — Z79.2 PROPHYLACTIC ANTIBIOTIC: ICD-10-CM

## 2022-12-08 PROBLEM — N80.9 ENDOMETRIOSIS DETERMINED BY LAPAROSCOPY: Status: ACTIVE | Noted: 2022-12-08

## 2022-12-08 PROBLEM — N72 ENDOCERVICITIS: Status: ACTIVE | Noted: 2022-12-08

## 2022-12-08 PROBLEM — N80.00 ENDOMETRIOSIS, UTERUS: Status: ACTIVE | Noted: 2022-12-08

## 2022-12-08 PROBLEM — E28.2 POLYCYSTIC OVARIES: Status: ACTIVE | Noted: 2022-12-08

## 2022-12-08 PROBLEM — N73.6 PELVIC ADHESIONS: Status: ACTIVE | Noted: 2022-12-08

## 2022-12-08 PROBLEM — N80.109 ENDOMETRIOSIS, OVARY: Status: ACTIVE | Noted: 2022-12-08

## 2022-12-08 PROBLEM — N71.1 CHRONIC ENDOMETRITIS: Status: ACTIVE | Noted: 2022-12-08

## 2022-12-08 LAB
HCG INTACT+B SERPL-ACNC: <1 MIU/ML
HOLD SPECIMEN: NORMAL

## 2022-12-08 PROCEDURE — 360N000087 HC SURGERY LEVEL 7 W/ FLUORO, PER MIN: Performed by: OBSTETRICS & GYNECOLOGY

## 2022-12-08 PROCEDURE — 710N000009 HC RECOVERY PHASE 1, LEVEL 1, PER MIN: Performed by: OBSTETRICS & GYNECOLOGY

## 2022-12-08 PROCEDURE — 88305 TISSUE EXAM BY PATHOLOGIST: CPT | Mod: TC | Performed by: OBSTETRICS & GYNECOLOGY

## 2022-12-08 PROCEDURE — C1765 ADHESION BARRIER: HCPCS | Performed by: OBSTETRICS & GYNECOLOGY

## 2022-12-08 PROCEDURE — 250N000009 HC RX 250: Performed by: ANESTHESIOLOGY

## 2022-12-08 PROCEDURE — 710N000012 HC RECOVERY PHASE 2, PER MINUTE: Performed by: OBSTETRICS & GYNECOLOGY

## 2022-12-08 PROCEDURE — 250N000011 HC RX IP 250 OP 636: Performed by: OBSTETRICS & GYNECOLOGY

## 2022-12-08 PROCEDURE — 87798 DETECT AGENT NOS DNA AMP: CPT | Performed by: OBSTETRICS & GYNECOLOGY

## 2022-12-08 PROCEDURE — 87102 FUNGUS ISOLATION CULTURE: CPT | Performed by: OBSTETRICS & GYNECOLOGY

## 2022-12-08 PROCEDURE — 258N000001 HC RX 258: Performed by: OBSTETRICS & GYNECOLOGY

## 2022-12-08 PROCEDURE — C9290 INJ, BUPIVACAINE LIPOSOME: HCPCS | Performed by: ANESTHESIOLOGY

## 2022-12-08 PROCEDURE — 87077 CULTURE AEROBIC IDENTIFY: CPT | Performed by: OBSTETRICS & GYNECOLOGY

## 2022-12-08 PROCEDURE — 87075 CULTR BACTERIA EXCEPT BLOOD: CPT | Performed by: OBSTETRICS & GYNECOLOGY

## 2022-12-08 PROCEDURE — 84702 CHORIONIC GONADOTROPIN TEST: CPT | Performed by: OBSTETRICS & GYNECOLOGY

## 2022-12-08 PROCEDURE — 250N000011 HC RX IP 250 OP 636: Performed by: ANESTHESIOLOGY

## 2022-12-08 PROCEDURE — 272N000001 HC OR GENERAL SUPPLY STERILE: Performed by: OBSTETRICS & GYNECOLOGY

## 2022-12-08 PROCEDURE — 87491 CHLMYD TRACH DNA AMP PROBE: CPT | Performed by: OBSTETRICS & GYNECOLOGY

## 2022-12-08 PROCEDURE — 999N000182 XR SURGERY CARM FLUORO GREATER THAN 5 MIN

## 2022-12-08 PROCEDURE — 250N000011 HC RX IP 250 OP 636: Performed by: NURSE ANESTHETIST, CERTIFIED REGISTERED

## 2022-12-08 PROCEDURE — 370N000017 HC ANESTHESIA TECHNICAL FEE, PER MIN: Performed by: OBSTETRICS & GYNECOLOGY

## 2022-12-08 PROCEDURE — 258N000003 HC RX IP 258 OP 636: Performed by: OBSTETRICS & GYNECOLOGY

## 2022-12-08 PROCEDURE — 258N000003 HC RX IP 258 OP 636: Performed by: NURSE ANESTHETIST, CERTIFIED REGISTERED

## 2022-12-08 PROCEDURE — 250N000009 HC RX 250: Performed by: OBSTETRICS & GYNECOLOGY

## 2022-12-08 PROCEDURE — 258N000003 HC RX IP 258 OP 636: Performed by: ANESTHESIOLOGY

## 2022-12-08 PROCEDURE — 96372 THER/PROPH/DIAG INJ SC/IM: CPT | Performed by: OBSTETRICS & GYNECOLOGY

## 2022-12-08 PROCEDURE — 250N000009 HC RX 250: Performed by: NURSE ANESTHETIST, CERTIFIED REGISTERED

## 2022-12-08 PROCEDURE — 87070 CULTURE OTHR SPECIMN AEROBIC: CPT | Performed by: OBSTETRICS & GYNECOLOGY

## 2022-12-08 PROCEDURE — 255N000002 HC RX 255 OP 636: Performed by: OBSTETRICS & GYNECOLOGY

## 2022-12-08 PROCEDURE — 36415 COLL VENOUS BLD VENIPUNCTURE: CPT | Performed by: OBSTETRICS & GYNECOLOGY

## 2022-12-08 PROCEDURE — 88342 IMHCHEM/IMCYTCHM 1ST ANTB: CPT | Mod: 26 | Performed by: PATHOLOGY

## 2022-12-08 PROCEDURE — 250N000025 HC SEVOFLURANE, PER MIN: Performed by: OBSTETRICS & GYNECOLOGY

## 2022-12-08 PROCEDURE — 88305 TISSUE EXAM BY PATHOLOGIST: CPT | Mod: 26 | Performed by: PATHOLOGY

## 2022-12-08 PROCEDURE — 999N000141 HC STATISTIC PRE-PROCEDURE NURSING ASSESSMENT: Performed by: OBSTETRICS & GYNECOLOGY

## 2022-12-08 PROCEDURE — 250N000013 HC RX MED GY IP 250 OP 250 PS 637: Performed by: OBSTETRICS & GYNECOLOGY

## 2022-12-08 RX ORDER — FENTANYL CITRATE 50 UG/ML
INJECTION, SOLUTION INTRAMUSCULAR; INTRAVENOUS PRN
Status: DISCONTINUED | OUTPATIENT
Start: 2022-12-08 | End: 2022-12-08

## 2022-12-08 RX ORDER — BUPIVACAINE HYDROCHLORIDE 2.5 MG/ML
INJECTION, SOLUTION EPIDURAL; INFILTRATION; INTRACAUDAL
Status: COMPLETED | OUTPATIENT
Start: 2022-12-08 | End: 2022-12-08

## 2022-12-08 RX ORDER — FENTANYL CITRATE 50 UG/ML
25 INJECTION, SOLUTION INTRAMUSCULAR; INTRAVENOUS
Status: DISCONTINUED | OUTPATIENT
Start: 2022-12-08 | End: 2022-12-08 | Stop reason: HOSPADM

## 2022-12-08 RX ORDER — MAGNESIUM SULFATE 4 G/50ML
4 INJECTION INTRAVENOUS ONCE
Status: COMPLETED | OUTPATIENT
Start: 2022-12-08 | End: 2022-12-08

## 2022-12-08 RX ORDER — SODIUM CHLORIDE, SODIUM LACTATE, POTASSIUM CHLORIDE, CALCIUM CHLORIDE 600; 310; 30; 20 MG/100ML; MG/100ML; MG/100ML; MG/100ML
INJECTION, SOLUTION INTRAVENOUS CONTINUOUS
Status: DISCONTINUED | OUTPATIENT
Start: 2022-12-08 | End: 2022-12-08 | Stop reason: HOSPADM

## 2022-12-08 RX ORDER — ONDANSETRON 2 MG/ML
4 INJECTION INTRAMUSCULAR; INTRAVENOUS EVERY 30 MIN PRN
Status: DISCONTINUED | OUTPATIENT
Start: 2022-12-08 | End: 2022-12-08 | Stop reason: HOSPADM

## 2022-12-08 RX ORDER — HYDROMORPHONE HYDROCHLORIDE 1 MG/ML
0.4 INJECTION, SOLUTION INTRAMUSCULAR; INTRAVENOUS; SUBCUTANEOUS EVERY 5 MIN PRN
Status: DISCONTINUED | OUTPATIENT
Start: 2022-12-08 | End: 2022-12-08 | Stop reason: HOSPADM

## 2022-12-08 RX ORDER — IBUPROFEN 800 MG/1
800 TABLET, FILM COATED ORAL EVERY 8 HOURS PRN
Qty: 30 TABLET | Refills: 1 | Status: ON HOLD | OUTPATIENT
Start: 2022-12-08 | End: 2023-12-13

## 2022-12-08 RX ORDER — CEFAZOLIN SODIUM/WATER 2 G/20 ML
2 SYRINGE (ML) INTRAVENOUS
Status: COMPLETED | OUTPATIENT
Start: 2022-12-08 | End: 2022-12-08

## 2022-12-08 RX ORDER — GLYCOPYRROLATE 0.2 MG/ML
INJECTION, SOLUTION INTRAMUSCULAR; INTRAVENOUS PRN
Status: DISCONTINUED | OUTPATIENT
Start: 2022-12-08 | End: 2022-12-08

## 2022-12-08 RX ORDER — FENTANYL CITRATE 50 UG/ML
25 INJECTION, SOLUTION INTRAMUSCULAR; INTRAVENOUS EVERY 5 MIN PRN
Status: DISCONTINUED | OUTPATIENT
Start: 2022-12-08 | End: 2022-12-08 | Stop reason: HOSPADM

## 2022-12-08 RX ORDER — NALOXONE HYDROCHLORIDE 0.4 MG/ML
0.2 INJECTION, SOLUTION INTRAMUSCULAR; INTRAVENOUS; SUBCUTANEOUS
Status: DISCONTINUED | OUTPATIENT
Start: 2022-12-08 | End: 2022-12-08 | Stop reason: HOSPADM

## 2022-12-08 RX ORDER — NALOXONE HYDROCHLORIDE 0.4 MG/ML
0.4 INJECTION, SOLUTION INTRAMUSCULAR; INTRAVENOUS; SUBCUTANEOUS
Status: DISCONTINUED | OUTPATIENT
Start: 2022-12-08 | End: 2022-12-08 | Stop reason: HOSPADM

## 2022-12-08 RX ORDER — LIDOCAINE 40 MG/G
CREAM TOPICAL
Status: DISCONTINUED | OUTPATIENT
Start: 2022-12-08 | End: 2022-12-08 | Stop reason: HOSPADM

## 2022-12-08 RX ORDER — PROPOFOL 10 MG/ML
INJECTION, EMULSION INTRAVENOUS CONTINUOUS PRN
Status: DISCONTINUED | OUTPATIENT
Start: 2022-12-08 | End: 2022-12-08

## 2022-12-08 RX ORDER — ACETAMINOPHEN 325 MG/1
975 TABLET ORAL ONCE
Status: COMPLETED | OUTPATIENT
Start: 2022-12-08 | End: 2022-12-08

## 2022-12-08 RX ORDER — MEPERIDINE HYDROCHLORIDE 25 MG/ML
12.5 INJECTION INTRAMUSCULAR; INTRAVENOUS; SUBCUTANEOUS
Status: DISCONTINUED | OUTPATIENT
Start: 2022-12-08 | End: 2022-12-08 | Stop reason: HOSPADM

## 2022-12-08 RX ORDER — SODIUM CHLORIDE, SODIUM LACTATE, POTASSIUM CHLORIDE, AND CALCIUM CHLORIDE .6; .31; .03; .02 G/100ML; G/100ML; G/100ML; G/100ML
IRRIGANT IRRIGATION PRN
Status: DISCONTINUED | OUTPATIENT
Start: 2022-12-08 | End: 2022-12-08 | Stop reason: HOSPADM

## 2022-12-08 RX ORDER — HYDROMORPHONE HYDROCHLORIDE 1 MG/ML
0.2 INJECTION, SOLUTION INTRAMUSCULAR; INTRAVENOUS; SUBCUTANEOUS EVERY 5 MIN PRN
Status: DISCONTINUED | OUTPATIENT
Start: 2022-12-08 | End: 2022-12-08 | Stop reason: HOSPADM

## 2022-12-08 RX ORDER — HALOPERIDOL 5 MG/ML
1 INJECTION INTRAMUSCULAR
Status: COMPLETED | OUTPATIENT
Start: 2022-12-08 | End: 2022-12-08

## 2022-12-08 RX ORDER — DIAZEPAM 10 MG/2ML
2.5 INJECTION, SOLUTION INTRAMUSCULAR; INTRAVENOUS
Status: DISCONTINUED | OUTPATIENT
Start: 2022-12-08 | End: 2022-12-08 | Stop reason: HOSPADM

## 2022-12-08 RX ORDER — ONDANSETRON 2 MG/ML
INJECTION INTRAMUSCULAR; INTRAVENOUS PRN
Status: DISCONTINUED | OUTPATIENT
Start: 2022-12-08 | End: 2022-12-08

## 2022-12-08 RX ORDER — FENTANYL CITRATE 50 UG/ML
50 INJECTION, SOLUTION INTRAMUSCULAR; INTRAVENOUS EVERY 5 MIN PRN
Status: DISCONTINUED | OUTPATIENT
Start: 2022-12-08 | End: 2022-12-08 | Stop reason: HOSPADM

## 2022-12-08 RX ORDER — PROPOFOL 10 MG/ML
INJECTION, EMULSION INTRAVENOUS PRN
Status: DISCONTINUED | OUTPATIENT
Start: 2022-12-08 | End: 2022-12-08

## 2022-12-08 RX ORDER — DEXAMETHASONE SODIUM PHOSPHATE 10 MG/ML
INJECTION, SOLUTION INTRAMUSCULAR; INTRAVENOUS PRN
Status: DISCONTINUED | OUTPATIENT
Start: 2022-12-08 | End: 2022-12-08

## 2022-12-08 RX ORDER — DOXYCYCLINE 100 MG/1
100 CAPSULE ORAL ONCE
Status: COMPLETED | OUTPATIENT
Start: 2022-12-08 | End: 2022-12-08

## 2022-12-08 RX ORDER — DOXYCYCLINE 100 MG/1
100 CAPSULE ORAL 2 TIMES DAILY
Qty: 9 CAPSULE | Refills: 0 | Status: ON HOLD | OUTPATIENT
Start: 2022-12-08 | End: 2023-12-13

## 2022-12-08 RX ORDER — SCOLOPAMINE TRANSDERMAL SYSTEM 1 MG/1
1 PATCH, EXTENDED RELEASE TRANSDERMAL ONCE
Status: DISCONTINUED | OUTPATIENT
Start: 2022-12-08 | End: 2022-12-08 | Stop reason: HOSPADM

## 2022-12-08 RX ORDER — ONDANSETRON 4 MG/1
4 TABLET, ORALLY DISINTEGRATING ORAL EVERY 30 MIN PRN
Status: DISCONTINUED | OUTPATIENT
Start: 2022-12-08 | End: 2022-12-08 | Stop reason: HOSPADM

## 2022-12-08 RX ORDER — HEPARIN SODIUM 5000 [USP'U]/.5ML
5000 INJECTION, SOLUTION INTRAVENOUS; SUBCUTANEOUS
Status: COMPLETED | OUTPATIENT
Start: 2022-12-08 | End: 2022-12-08

## 2022-12-08 RX ORDER — CEFAZOLIN SODIUM/WATER 2 G/20 ML
2 SYRINGE (ML) INTRAVENOUS SEE ADMIN INSTRUCTIONS
Status: DISCONTINUED | OUTPATIENT
Start: 2022-12-08 | End: 2022-12-08 | Stop reason: HOSPADM

## 2022-12-08 RX ADMIN — PHENYLEPHRINE HYDROCHLORIDE 100 MCG: 10 INJECTION INTRAVENOUS at 09:46

## 2022-12-08 RX ADMIN — DOXYCYCLINE HYCLATE 100 MG: 100 CAPSULE ORAL at 15:39

## 2022-12-08 RX ADMIN — PROPOFOL 20 MG: 10 INJECTION, EMULSION INTRAVENOUS at 08:15

## 2022-12-08 RX ADMIN — ROCURONIUM BROMIDE 50 MG: 50 INJECTION, SOLUTION INTRAVENOUS at 08:48

## 2022-12-08 RX ADMIN — SODIUM CHLORIDE, POTASSIUM CHLORIDE, SODIUM LACTATE AND CALCIUM CHLORIDE: 600; 310; 30; 20 INJECTION, SOLUTION INTRAVENOUS at 12:22

## 2022-12-08 RX ADMIN — ACETAMINOPHEN 975 MG: 325 TABLET ORAL at 06:34

## 2022-12-08 RX ADMIN — PHENYLEPHRINE HYDROCHLORIDE 100 MCG: 10 INJECTION INTRAVENOUS at 12:52

## 2022-12-08 RX ADMIN — BUPIVACAINE 20 ML: 13.3 INJECTION, SUSPENSION, LIPOSOMAL INFILTRATION at 13:47

## 2022-12-08 RX ADMIN — MAGNESIUM SULFATE HEPTAHYDRATE 4 G: 80 INJECTION, SOLUTION INTRAVENOUS at 06:40

## 2022-12-08 RX ADMIN — ROCURONIUM BROMIDE 10 MG: 50 INJECTION, SOLUTION INTRAVENOUS at 11:41

## 2022-12-08 RX ADMIN — HYDROMORPHONE HYDROCHLORIDE 0.5 MG: 1 INJECTION, SOLUTION INTRAMUSCULAR; INTRAVENOUS; SUBCUTANEOUS at 10:52

## 2022-12-08 RX ADMIN — HALOPERIDOL LACTATE 1 MG: 5 INJECTION, SOLUTION INTRAMUSCULAR at 15:54

## 2022-12-08 RX ADMIN — BUPIVACAINE HYDROCHLORIDE 20 ML: 2.5 INJECTION, SOLUTION EPIDURAL; INFILTRATION; INTRACAUDAL at 13:47

## 2022-12-08 RX ADMIN — DEXAMETHASONE SODIUM PHOSPHATE 10 MG: 10 INJECTION, SOLUTION INTRAMUSCULAR; INTRAVENOUS at 08:00

## 2022-12-08 RX ADMIN — ROCURONIUM BROMIDE 10 MG: 50 INJECTION, SOLUTION INTRAVENOUS at 12:30

## 2022-12-08 RX ADMIN — GLYCOPYRROLATE 0.2 MG: 0.2 INJECTION, SOLUTION INTRAMUSCULAR; INTRAVENOUS at 08:43

## 2022-12-08 RX ADMIN — LIDOCAINE HYDROCHLORIDE 3 ML: 10 INJECTION, SOLUTION INFILTRATION; PERINEURAL at 07:48

## 2022-12-08 RX ADMIN — ROCURONIUM BROMIDE 30 MG: 50 INJECTION, SOLUTION INTRAVENOUS at 09:36

## 2022-12-08 RX ADMIN — FENTANYL CITRATE 25 MCG: 50 INJECTION INTRAMUSCULAR; INTRAVENOUS at 14:30

## 2022-12-08 RX ADMIN — HEPARIN SODIUM 5000 UNITS: 10000 INJECTION, SOLUTION INTRAVENOUS; SUBCUTANEOUS at 06:44

## 2022-12-08 RX ADMIN — PROPOFOL 180 MG: 10 INJECTION, EMULSION INTRAVENOUS at 07:48

## 2022-12-08 RX ADMIN — PHENYLEPHRINE HYDROCHLORIDE 100 MCG: 10 INJECTION INTRAVENOUS at 08:48

## 2022-12-08 RX ADMIN — Medication 2 G: at 11:49

## 2022-12-08 RX ADMIN — PROPOFOL 20 MCG/KG/MIN: 10 INJECTION, EMULSION INTRAVENOUS at 07:55

## 2022-12-08 RX ADMIN — ROCURONIUM BROMIDE 50 MG: 50 INJECTION, SOLUTION INTRAVENOUS at 07:48

## 2022-12-08 RX ADMIN — ROCURONIUM BROMIDE 20 MG: 50 INJECTION, SOLUTION INTRAVENOUS at 10:43

## 2022-12-08 RX ADMIN — ONDANSETRON 4 MG: 2 INJECTION INTRAMUSCULAR; INTRAVENOUS at 08:00

## 2022-12-08 RX ADMIN — SUGAMMADEX 200 MG: 100 INJECTION, SOLUTION INTRAVENOUS at 13:38

## 2022-12-08 RX ADMIN — SCOPALAMINE 1 PATCH: 1 PATCH, EXTENDED RELEASE TRANSDERMAL at 07:29

## 2022-12-08 RX ADMIN — ROCURONIUM BROMIDE 10 MG: 50 INJECTION, SOLUTION INTRAVENOUS at 11:54

## 2022-12-08 RX ADMIN — PHENYLEPHRINE HYDROCHLORIDE 0.2 MCG/KG/MIN: 10 INJECTION INTRAVENOUS at 09:00

## 2022-12-08 RX ADMIN — ROCURONIUM BROMIDE 10 MG: 50 INJECTION, SOLUTION INTRAVENOUS at 11:00

## 2022-12-08 RX ADMIN — ROCURONIUM BROMIDE 20 MG: 50 INJECTION, SOLUTION INTRAVENOUS at 10:15

## 2022-12-08 RX ADMIN — SODIUM CHLORIDE, POTASSIUM CHLORIDE, SODIUM LACTATE AND CALCIUM CHLORIDE: 600; 310; 30; 20 INJECTION, SOLUTION INTRAVENOUS at 09:36

## 2022-12-08 RX ADMIN — ROCURONIUM BROMIDE 50 MG: 50 INJECTION, SOLUTION INTRAVENOUS at 08:15

## 2022-12-08 RX ADMIN — FENTANYL CITRATE 50 MCG: 50 INJECTION, SOLUTION INTRAMUSCULAR; INTRAVENOUS at 07:48

## 2022-12-08 RX ADMIN — SODIUM CHLORIDE, POTASSIUM CHLORIDE, SODIUM LACTATE AND CALCIUM CHLORIDE: 600; 310; 30; 20 INJECTION, SOLUTION INTRAVENOUS at 06:29

## 2022-12-08 RX ADMIN — HYDROMORPHONE HYDROCHLORIDE 0.5 MG: 1 INJECTION, SOLUTION INTRAMUSCULAR; INTRAVENOUS; SUBCUTANEOUS at 09:13

## 2022-12-08 RX ADMIN — FENTANYL CITRATE 50 MCG: 50 INJECTION, SOLUTION INTRAMUSCULAR; INTRAVENOUS at 08:00

## 2022-12-08 RX ADMIN — ROCURONIUM BROMIDE 20 MG: 50 INJECTION, SOLUTION INTRAVENOUS at 11:06

## 2022-12-08 RX ADMIN — FENTANYL CITRATE 25 MCG: 50 INJECTION INTRAMUSCULAR; INTRAVENOUS at 14:08

## 2022-12-08 RX ADMIN — MIDAZOLAM 2 MG: 1 INJECTION INTRAMUSCULAR; INTRAVENOUS at 07:44

## 2022-12-08 RX ADMIN — Medication 2 G: at 08:04

## 2022-12-08 ASSESSMENT — ACTIVITIES OF DAILY LIVING (ADL)
ADLS_ACUITY_SCORE: 20

## 2022-12-08 NOTE — ANESTHESIA CARE TRANSFER NOTE
Patient: Mary Carlin    Procedure: Procedure(s):  ENDOCERVICAL AND ENDOMETRIAL CULTURES AND BIOPSIES,  BILATERAL SELECTIVE HYSTEROSALPINGOGRAM, LEFT FALLOPIAN TUBE RECANALIZATION (HYSTEROSCOPIC GUIDED),  DIAGNOSTIC HYSTEROSCOPY  ROBOTIC GUIDED CARBON DIOXIDE LASER, BILATERAL OVARIAN WEDGE RESECTION, EXCISION AND VAPORIZATION OF ENDOMETRIOSIS, LYSIS OF ADHESIONS, ADHESION PREVENTION MEASURES       Diagnosis: Polycystic ovarian syndrome [E28.2]  Abnormal uterine bleeding [N93.9]  Recurrent pregnancy loss [N96]  History of endometriosis [Z87.42]  Pelvic adhesions [N73.6]  History of chronic endometritis [Z87.42]  Diagnosis Additional Information: No value filed.    Anesthesia Type:   General     Note:    Oropharynx: oropharynx clear of all foreign objects  Level of Consciousness: awake  Oxygen Supplementation: face mask  Level of Supplemental Oxygen (L/min / FiO2): 6  Independent Airway: airway patency satisfactory and stable  Dentition: dentition unchanged  Vital Signs Stable: post-procedure vital signs reviewed and stable  Report to RN Given: handoff report given  Patient transferred to: PACU    Handoff Report: Identifed the Patient, Identified the Reponsible Provider, Reviewed the pertinent medical history, Discussed the surgical course, Reviewed Intra-OP anesthesia mangement and issues during anesthesia, Set expectations for post-procedure period and Allowed opportunity for questions and acknowledgement of understanding      Vitals:  Vitals Value Taken Time   BP 92/70    Temp 97    Pulse 120    Resp 16    SpO2 100%        Electronically Signed By: CECIL Ovalles CRNA  December 8, 2022  2:01 PM

## 2022-12-08 NOTE — OP NOTE
Operative Note   12/08/22     Patient: Mary Carlin 1985     Procedure: Procedure(s):  ENDOCERVICAL AND ENDOMETRIAL CULTURES AND BIOPSIES,  BILATERAL SELECTIVE HYSTEROSALPINGOGRAM, LEFT FALLOPIAN TUBE RECANALIZATION (HYSTEROSCOPIC GUIDED),  DIAGNOSTIC HYSTEROSCOPY  ROBOTIC GUIDED CARBON DIOXIDE LASER, BILATERAL OVARIAN WEDGE RESECTION, EXCISION AND VAPORIZATION OF ENDOMETRIOSIS, LYSIS OF ADHESIONS, ADHESION PREVENTION MEASURES     Surgeon(s): Pippa Alatorre MD    Pre-operative Diagnosis:   Polycystic ovarian syndrome [E28.2]  Abnormal uterine bleeding [N93.9]  Recurrent pregnancy loss [N96]  History of endometriosis [Z87.42]  Pelvic adhesions [N73.6]  History of chronic endometritis [Z87.42]     Post-operative Diagnosis:   Same plus:     Chronic endometritis     Endocervicitis     Polycystic ovaries     Pelvic adhesions     Endometriosis determined by laparoscopy     Endometriosis, uterus     Endometriosis, ovary     Anestheisa: general, TAP blocks    Antibiotics: Ancef IV, doxycycline PO in post-op    EBL: 25 mL from 12/8/2022  7:37 AM to 12/8/2022  1:58 PM     UOP: 1100cc    IV Fluids: 2100cc    Findings:  Speculum Exam:  Grade I anterior, grade II posterior cervical ectropion    Selective-Hysterosalpingogram  Uterus: normal fill  Right fallopian tube: pressure 0.5 JOSE with free spill  Left fallopian tube: pressure 2.0 JOSE with no spill; after fallopian tube recanalization, pressure 1.0 JOSE with free spill     Hysteroscopy  Endometrium: diffuse stippling  Cavity: normal shape  Right tubal ostia: patent  Left tubal ostia: patent  Cervix: cervical crypts seen with micropolyps    Laparoscopy  Uterus: Large lacy vascular lesion consistent with endometriosis covering fundus of uterus, stretches of this continued onto the anterior uterus and posterior uterus  Right fallopian tube: Normal course, normal fimbriae  Right ovary: enlarged size consistent with polycystic ovary, surface smooth with multiple  vesicular lesions consistent with endometriosis, multiple dilated blood vessels  Right pelvic side wall: vesicular lesion consistent with endometriosis  Right broad ligament: Appeared normal  Right uterosacral ligament: Appeared normal  Posterior cul-de-sac: Brown vesicular lesion consistent with endometriosis next to rectum, multiple clear vesicles consistent with endometriosis   Left uterosacral ligament: Appeared normal  Left pelvic sidewall: multiple adhesions consistent with endometriosis overlying the left ureter   Left broad ligament: Appeared normal  Left ovary: Enlarged size consistent with polycystic ovary, surface smooth with multiple vesicular and red lesions consistent with endometriosis, 1 large inflammatory bleb consistent with endometriosis, multiple dilated blood vessels, vestment adhesions covering about 30% of the ovary  Left fallopian tube: Normal course, normal fimbriae  Left round ligament: Appeared normal  Bladder peritoneum: Appeared normal   Right round ligament: Appeared normal  Abdominal wall: Appeared normal  Appendix: Appeared normal  Cecum: Appeared normal  Terminal ileum: Appeared normal  Omentum: Appeared normal  Gall bladder: Appeared normal  Liver: right and left lobes appeared normal  Diaphragm: right and left sides appeared normal  Sigmoid colon: appeared normal with normal epiploica   Left pericolic gutter: Appeared normal  Right pericolic gutter: Appeared normal  Rectum: Appeared normal, although brown vesicle abuts rectum    Description of Procedure:   After informed consent was obtained, the patient was brought to the operating room.  She was transferred to the operating room table and underwent general anesthesia.  She was then placed in lithotomy with the yellowfin stirrups with arms tucked assuring adequate padding to prevent neurovascular compromise.  The patient was prepped and draped on the abdomen and perineum. A speculum was placed into the vagina and endocervical and  endometrial cultures were obtained.  An internal vaginal prep was then performed, and the ramos catheter was placed. The selective hysterosalpingogram was then performed with the Gwynedd Clem catheter under fluoroscopic guidance with the above findings. The hysteroscope was used to guide the catheter to the left ostia in order for the left fallopian tube recannulization to be performed as this was unable to be completed soley under fluoroscopic guidance due to the angle of the opening. The flexible wire from the Gwynedd Clem catheter set was used for 3 passes down the left fallopian tube.  Repeat selective fluoroscopic evaluation of the left fallopian tube revealed the above findings. The catheter was removed and the remainder of the diagnostic hysteroscopy was performed, and the entirety of the endometrial cavity and endocervix were assessed with the above findings; the interior of the cavity appeared a normal shape with no visualization of a fibroid protruding into the cavity, thus the myomectomy was not performed. The hysteroscope was removed. Endocervical and endometrial biopsies were obtained.The uterus sounded to 7 cm, and thus the 6 cm Nilam tip was applied to the Nilam uterine manipulator and this was placed in the uterus.  The surgeon's gown and gloves were changed and attention was turned to the laparoscopic portion of the case. An incision was made in the umbilicus, and a Kocher clamp was used to grasp the fascia.  The fascia was grasped inferior to this and then lateral to this point on both sides with an additional Kocher. With the fascia significantly elevated, an incision was made in the fascia, and 8mm blunt tipped trocar was placed.  Intra-abdominal placement was confirmed with the laparoscope, and the abdominal cavity was insufflated to a pressure of 15 for port placement.      Additional 8 mm robotic ports were placed in the left lower quadrant and right lower quadrant under direct  visualization assuring the inferior epigastrics were not in the line of the trocar. A left upper quadrant 8 mm robotic trocar was placed and an 8 mm Airseal assistant trocar was placed in the right upper quadrant. The insufflation pressure was reduced to 10. A thorough, close look diagnostic laparoscopy was performed with the above findings. The robot was then docked.     The CO2 laser was used to circumscribe and excise lesions of endometriosis and adhesions, and these were sent to pathology as in the specimen list below. Careful attention was paid to monitor the course of the ureters and assure that the dissection did not compromise the ureters. Lesions less than 1-2mm and the lacy lesion on the uterus were laser vaporized. All of the endometriosis that could be seen was excised or vaporized for full and thorough treatment.     The ovarian wedge resection was performed on the left ovary by stabilizing the ovary with the small grasping retractor near the hilum.  The ovary was measured to be 3cm x 4cm.  The cortex of the ovary was incised with the CO2 laser in a wedge shape while carefully measuring the remaining ovary to assure that the ovary would be a normal size at the conclusion of the procedure.  Curved cold scissors were then used to dissect the wedge out of the ovary and rupture any small follicles that were able to be ruptured through the area where the wedge was removed. The left wedge was removed through the AirSeal port. A 4-0 V-Loc suture was used to close the ovarian medulla in 3 layers (due to the height of this ovary) with a deep running stitch followed by a running horizontal imbricating stitch in the cortex to assure that no rough edges were showing at the conclusion of the repair.  The ovary appeared hemostatic at the conclusion of the repair and was measured to be 2cm x 2cm.  An identical procedure was completed on the right ovary which measured 4cm x 3cm prior to the resection and 2cm x 2cm  after the resection and repair. The right wedge was placed in a 5 mm laparoscopic specimen bag and removed through the AirSeal port.     Thorough irrigation took place throughout the case, and at the conclusion of the case the irrigation was clear.  The robot was undocked. The irrigation fluid near the liver was suctioned, and the area irrigated and suctioned until the fluid was clear. A Seprafilm slurry was placed in the pelvis at the conclusion of the surgery to assist with adhesion prevention. The intra-abdominal gas was allowed to escape through the trocars including multiple Valsalvas to assist with gas removal.  The trocars were removed. The fascia of the umbilical incision was closed with a figure-of-eight stitch with 0 Vicryl.  The skin incisions were closed with a subcuticular stitch with 4-0 Vicryl.  The incisions were covered with Telfa, a folded 2x2 gauze, and a Tegaderm for pressure dressings. The uterine manipulator and Zhu were removed. Instrument and sponge counts were correct. The patient was cleaned and taken to the recovery room in stable condition.  She tolerated the procedure well.    Specimens:    ID Type Source Tests Collected by Time Destination   1 : ENDOMETRIUM BIOPSY; please evaluate for chronic  endometritis including CD [id number] Biopsy Endometrium SURGICAL PATHOLOGY EXAM Pippa Alatorre MD 12/8/2022  8:57 AM    2 : ENDOCERVICAL BIOPSY please evaluate for acute and/ or chronic inflammation Tissue Endocervical/cervical SURGICAL PATHOLOGY EXAM Pippa Alatorre MD 12/8/2022  9:08 AM    3 : LEFT PELVIC SIDE WALL ADHESION Tissue Pelvis, Left SURGICAL PATHOLOGY EXAM Pippa Alatorre MD 12/8/2022 10:23 AM    4 : POSTERIOR CUL DE SAC ENDOMETRIOSIS Tissue Other SURGICAL PATHOLOGY EXAM Pippa Alatorre MD 12/8/2022 10:31 AM    5 : LEFT OVARY ADHESION ENDOMETRIOSIS Tissue Ovary, Left SURGICAL PATHOLOGY EXAM Pippa Alatorre MD 12/8/2022 10:44 AM    6 : LEFT OVARY WEDGE  ENDOMETRIOSIS Tissue Ovary, Left SURGICAL PATHOLOGY EXAM Pippa Alatorre MD 12/8/2022 11:00 AM    7 : RIGHT FUNDUS UTERUS ADHESION Tissue Uterus SURGICAL PATHOLOGY EXAM Pippa Alatorre MD 12/8/2022 11:41 AM    8 : RIGHT OVARIAN WEDGE WITH ENDOMETRIOSIS Tissue Ovary, Right SURGICAL PATHOLOGY EXAM Pippa Alatorre MD 12/8/2022 12:01 PM    A : ENDOCERVICAL SWAB Swab Endocervical/cervical CHLAMYDIA TRACHOMATIS/NEISSERIA GONORRHOEAE BY PCR Pippa Alatorre MD 12/8/2022  8:43 AM    B : ENDOCERVICAL SWAB Swab Endocervical/cervical UROGENITAL UREAPLASMA AND MYCOPLASMA SPECIES BY PCR Pippa Alatorre MD 12/8/2022  8:46 AM    C : ENDOMETRIUM TISSUE Tissue Endometrium ANAEROBIC BACTERIAL CULTURE ROUTINE, FUNGAL OR YEAST CULTURE ROUTINE, AEROBIC BACTERIAL CULTURE ROUTINE Pippa Alatorre MD 12/8/2022  8:48 AM      Drains: ramos catheter (removed at conclusion of procedure), uterine manipulator (removed at conclusion of procedure)    Implants: none    Complications: none    Pippa Alatorre MD

## 2022-12-08 NOTE — ANESTHESIA PREPROCEDURE EVALUATION
Anesthesia Pre-Procedure Evaluation    Patient: Mayr Carlin   MRN: [id number] : 1985        Procedure : Procedure(s):  ENDOCERVICAL AND ENDOMETRIAL CULTURES AND BIOPSIES,  BILATERAL SELECTIVE HYSTEROSALPINGOGRAM,  HYSTEROSCOPIC MYOMECTOMY,  ROBOTIC GUIDED CARBON DIOXIDE LASER, BILATERAL WEDGE RESECTION, EXCISION OF ENDOMETRIOSIS, LYSIS OF ADHESIONS, ADHESION PREVENTION MEASURES,POSSIBLE BILATERAL FALLOPIAN TUBE RECANALIZATION,  POSSIBLE LAPAROSCOPIC APPENDECTOMY, POSSIBLE PLACEMENT OF GORETEX MEMBRANE          Past Medical History:   Diagnosis Date     ADHD (attention deficit hyperactivity disorder)      Endometriosis      Female infertility     frequent losses, pt on blood thinners.     Hx of tonsillectomy      Irregular menstrual cycle      Mental disorder     hx of anxiety prior to pregnancy. Off zoloft prior to pregnancy     Migraines      Other and unspecified ovarian cyst      Other malaise and fatigue      PCOS (polycystic ovarian syndrome)      PONV (postoperative nausea and vomiting)     with tonsillectomy     Postpartum depression     hospitalized for 5 days     Pregnant state, incidental       Past Surgical History:   Procedure Laterality Date     COLONOSCOPY       DILATION AND CURETTAGE N/A 2019    Procedure: SUCTION CURETTAGE, UTERINE CULTURE;  Surgeon: Lemuel Nj MD;  Location: [facility] [place];  Service: Obstetrics     PA HYSTEROSCOPY,W/ENDO BX N/A 2019    Procedure: HYSTEROSCOPY, DILATATION AND CURETTAGE, UTERINE CULTURE;  Surgeon: Lemuel Nj MD;  Location: [facility] [place];  Service: Gynecology     PA LAP,DIAGNOSTIC ABDOMEN N/A 2019    Procedure: LAPAROSCOPY WITH TUBAL DYE STUDY. FULGURATION OF ENDOMETRIOSIS;  Surgeon: Lemuel Nj MD;  Location: [facility] [place];  Service: Gynecology     TONSILLECTOMY Bilateral     age 6      Allergies   Allergen Reactions     Chlorhexidine Towelette [Chlorhexidine] Hives     Reglan  "[Metoclopramide] Unknown     Emotionally \"crawling out of my skin\"     Wheat Bran Other (See Comments) and Itching       Gi upset      Social History     Tobacco Use     Smoking status: Never     Smokeless tobacco: Never   Substance Use Topics     Alcohol use: Not Currently      Wt Readings from Last 1 Encounters:   12/08/22 94 kg (207 lb 3.2 oz)        Anesthesia Evaluation   Pt has had prior anesthetic.     History of anesthetic complications  - PONV.      ROS/MED HX  ENT/Pulmonary:  - neg pulmonary ROS     Neurologic:  - neg neurologic ROS     Cardiovascular:  - neg cardiovascular ROS     METS/Exercise Tolerance:     Hematologic:  - neg hematologic  ROS     Musculoskeletal:  - neg musculoskeletal ROS     GI/Hepatic:  - neg GI/hepatic ROS     Renal/Genitourinary:  - neg Renal ROS     Endo:     (+) Obesity,     Psychiatric/Substance Use:     (+) psychiatric history anxiety and depression     Infectious Disease:  - neg infectious disease ROS     Malignancy:  - neg malignancy ROS     Other:  - neg other ROS          Physical Exam    Airway  airway exam normal      Mallampati: I   TM distance: > 3 FB   Neck ROM: full   Mouth opening: > 3 cm    Respiratory Devices and Support         Dental  no notable dental history         Cardiovascular   cardiovascular exam normal       Rhythm and rate: regular and normal     Pulmonary   pulmonary exam normal        breath sounds clear to auscultation           OUTSIDE LABS:  CBC:   Lab Results   Component Value Date    WBC 9.5 06/11/2021    WBC 8.8 04/03/2019    HGB 10.2 (L) 06/11/2021    HGB 12.2 08/19/2019    HCT 32.7 (L) 06/11/2021    HCT 41.1 04/03/2019     06/11/2021     04/03/2019     BMP:   Lab Results   Component Value Date     04/03/2019     03/29/2018    POTASSIUM 4.1 04/03/2019    POTASSIUM 3.6 03/29/2018    CHLORIDE 107 04/03/2019    CHLORIDE 104 03/29/2018    CO2 22 04/03/2019    CO2 22 03/29/2018    BUN 12 04/03/2019    BUN 8 03/29/2018    " CR 0.67 06/11/2021    CR 0.82 04/03/2019     04/03/2019     03/29/2018     COAGS:   Lab Results   Component Value Date    PTT 26 06/11/2021    INR 0.90 06/11/2021     POC:   Lab Results   Component Value Date    HCG Negative 02/27/2019     HEPATIC:   Lab Results   Component Value Date    ALBUMIN 4.5 04/03/2019    PROTTOTAL 7.4 04/03/2019    ALT 10 06/11/2021    AST 18 06/11/2021    ALKPHOS 66 04/03/2019    BILITOTAL 0.7 04/03/2019     OTHER:   Lab Results   Component Value Date    SHAUN 10.2 04/03/2019    LIPASE 13 04/03/2019       Anesthesia Plan    ASA Status:  2   NPO Status:  NPO Appropriate    Anesthesia Type: General.     - Airway: ETT   Induction: Intravenous.   Maintenance: Balanced.        Consents    Anesthesia Plan(s) and associated risks, benefits, and realistic alternatives discussed. Questions answered and patient/representative(s) expressed understanding.    - Discussed:     - Discussed with:  Patient      - Extended Intubation/Ventilatory Support Discussed: Yes.      - Patient is DNR/DNI Status: No    Use of blood products discussed: Yes.     - Discussed with: Patient.     Postoperative Care    Pain management: Peripheral nerve block (Single Shot), Multi-modal analgesia.   PONV prophylaxis: Ondansetron (or other 5HT-3), Dexamethasone or Solumedrol, Background Propofol Infusion     Comments:    Other Comments: Bilateral TAP blocks for POA per surgeon request.            Roberto Horan MD

## 2022-12-08 NOTE — DISCHARGE INSTRUCTIONS
Discharge Instructions from Dr. Alatorre    Pain:  Take (at the same time or alternating if you prefer) 800 mg of ibuprofen every 8 hours and 1000 mg of Tylenol every 8 hours.   Use a heating pad for shoulder pain if you experience this.  This is from the gas that was used to fill your abdomen during the time of surgery.   Use ice packs on your incisions if they are painful.  Your abdominal muscles may be sore (feeling like to exercised them) for several days follow surgery. This is due to the gas that was used to fill your abdomen at the time of surgery and is a normal sensation following surgery.   You may have mild swelling in the face/upper half of the body for the 24 hours following surgery. This is from being in trendelenberg position (upper part of the body tilted slightly down during surgery for best view of the pelvis). The swelling should resolve within 24 hours following surgery.  You may have a sore throat from the breathing tube used during surgery. This should resolve within a few days following surgery.     Of note, the first 2-3 menses and ovulations following surgery may be more painful than they will be in the future once you are completely healed due to the inflammation involved in healing. Consider the 3rd or 4th menses after surgery as the new baseline for you if you are having more pain in the first few cycles.     Incisions:  The day after the surgery, you can shower and remove the bandages over the incisions and clean the incisions gently with soap and water. Bandages do not need to be placed over the incisions after this; the incisions should be open to the air.  The incision in or near the bellybutton as well as other incisions should be cleaned daily and dried very well.    The incision in or near the bellybutton may need to be dried with a blow dryer if it cannot be dried completely with a towel.   Keeping the incisions clean and dry will decrease the chance of infection.   It is not  required, but some patients have found that an abdominal binder can make the incisions more comfortable with movement.    Activity:  Be sure to walk the length of a football field at least 3 times per day everyday following surgery and at least once the night of surgery. This will help to prevent complications and help you to recover faster.   Restrict lifting to less than 20 pounds for 2 weeks and less than 40 pounds for 4 weeks.  Do not submerge in water (bath in bathtub, swimming in pool, swimming in lake, etc.) for 2 weeks.  Showering is fine and encouraged.   Do not drive while you have significant pain. Before driving, sit in the car with it turned off and press the brakes quickly; if you are able to do this without significant pain, it is ok to drive.   Do not drive if you are taking narcotic pain medications (oxycodone, hydrocodone); wait until 24 hours after the last dose.  Activities other than those listed as not to do above are okay to do following surgery.    Nutrition:  It is very important to consume protein in order to recover well from surgery.  Your body needs protein to heal after surgery.  The recommended amount is 0.7g to 0.9g per pound of body weight per day for 3 months after surgery. Multiply your body weight in pounds by 0.9 grams, and this is the goal amount of protein intake per day.  Multiply your body weight in pounds by 0.7 grams, and this is the minimal amount of protein intake per day recommended for recovering from surgery. It is okay if you do not get this much some days, this is an estimated goal. Having protein in a drink can be helpful such as bone broth (be sure to check that there is a good amount of protein in it) or protein supplement shakes. Eating smaller amounts more frequently may also be helpful.  Please also be sure to drink at least 2 liters of liquids every day.    Stool Softener:  If you do not have a regular bowel movement by the day after surgery, please take a  stool softener such as colace (docusate sodium) 100mg one to two times per day until you have at least one regular soft bowel movement per day.     Call the after-hours call number ([phone]) if after hours or the office ([phone]) if during hours if you have any concerns or questions including:  Fever of 100.4 Fahrenheit degrees or higher.  Pus draining from the incisions or red incisions (clear fluid or blood from the incisions is okay as long as it is a minimal amount).  Significant swelling in one leg.  Difficulty with urination or unable to urinate for a period of more than 6 hours.  Nausea or vomiting that makes you unable to eat for more than a day.   Heavy bleeding greater than the amount of a normal period (spotting with or without very small pieces of tissue for a few days following the procedure is normal due to the biopsies that were done; you may also notice a small amount of gel that is used in placement of the catheter that drains your bladder during surgery).    Antibiotics:  Because your left fallopian tube was blocked and cleared, it is recommended that you take doxycycline 2 times per day for a total of 10 doses (9 pills in prescription and one dose you got during or right after surgery).  It is important that you take this with food and sit upright for 30 minutes following taking this medication.  If you lie down right after taking this medication, it can irritate your esophagus.    Pippa Alatorre MD

## 2022-12-08 NOTE — ANESTHESIA PROCEDURE NOTES
"TAP Procedure Note    Pre-Procedure   Staff -        Anesthesiologist:  Roberto Horan MD       Performed By: anesthesiologist       Location: OR       Procedure Start/Stop Times: 12/8/2022 1:42 PM and 12/8/2022 1:47 PM       Pre-Anesthestic Checklist: patient identified, IV checked, site marked, risks and benefits discussed, informed consent, monitors and equipment checked, pre-op evaluation, at physician/surgeon's request and post-op pain management  Timeout:       Correct Patient: Yes        Correct Procedure: Yes        Correct Site: Yes        Correct Position: Yes        Correct Laterality: N/A        Site Marked: N/A  Procedure Documentation  Procedure: TAP       Laterality: bilateral       Patient Position: supine       Patient Prep/Sterile Barriers: sterile gloves, mask       Skin prep: Chloraprep       Needle Type: short bevel       Needle Gauge: 20.        Needle Length (Inches): 4        Ultrasound guided       1. Ultrasound was used to identify targeted nerve, plexus, vascular marker, or fascial plane and place a needle adjacent to it in real-time.       2. Ultrasound was used to visualize the spread of anesthetic in close proximity to the above referenced structure.       3. A permanent image is entered into the patient's record.       4. The visualized anatomic structures appeared normal.       5. There were no apparent abnormal pathologic findings.    Assessment/Narrative         The placement was negative for: blood aspirated       Complications: none    Medication(s) Administered   Bupivacaine 0.25% PF (Infiltration) - Infiltration   20 mL - 12/8/2022 1:47:00 PM  Bupivacaine liposome (Exparel) 1.3% LA inj susp (Infiltration) - Infiltration   20 mL - 12/8/2022 1:47:00 PM  Medication Administration Time: 12/8/2022 1:42 PM      FOR [facility] ([facility] [place]) ONLY:   Pain Team Contact information: please page the Pain Team [address] [employer]. Search \"Pain\". During daytime hours, please page the attending first. " At night please page the resident first.

## 2022-12-08 NOTE — ANESTHESIA POSTPROCEDURE EVALUATION
Patient: Mary Carlin    Procedure: Procedure(s):  ENDOCERVICAL AND ENDOMETRIAL CULTURES AND BIOPSIES,  BILATERAL SELECTIVE HYSTEROSALPINGOGRAM, LEFT FALLOPIAN TUBE RECANALIZATION (HYSTEROSCOPIC GUIDED),  DIAGNOSTIC HYSTEROSCOPY  ROBOTIC GUIDED CARBON DIOXIDE LASER, BILATERAL OVARIAN WEDGE RESECTION, EXCISION AND VAPORIZATION OF ENDOMETRIOSIS, LYSIS OF ADHESIONS, ADHESION PREVENTION MEASURES       Anesthesia Type:  General    Note:  Disposition: Outpatient   Postop Pain Control: Uneventful            Sign Out: Well controlled pain   PONV: No   Neuro/Psych: Uneventful            Sign Out: Acceptable/Baseline neuro status   Airway/Respiratory: Uneventful            Sign Out: Acceptable/Baseline resp. status   CV/Hemodynamics: Uneventful            Sign Out: Acceptable CV status; No obvious hypovolemia; No obvious fluid overload   Other NRE: NONE   DID A NON-ROUTINE EVENT OCCUR? No           Last vitals:  Vitals Value Taken Time   /57 12/08/22 1445   Temp 36.1  C (97  F) 12/08/22 1445   Pulse 115 12/08/22 1442   Resp 18 12/08/22 1445   SpO2 97 % 12/08/22 1445   Vitals shown include unvalidated device data.    Electronically Signed By: Roberto Horan MD  December 8, 2022  4:24 PM

## 2022-12-08 NOTE — ANESTHESIA PROCEDURE NOTES
Airway       Patient location during procedure: OR       Procedure Start/Stop Times: 12/8/2022 7:53 AM  Staff -        Performed By: CRNAIndications and Patient Condition       Indications for airway management: alla-procedural       Induction type:intravenous       Mask difficulty assessment: 1 - vent by mask    Final Airway Details       Final airway type: endotracheal airway       Successful airway: ETT - single  Endotracheal Airway Details        ETT size (mm): 7.0       Cuffed: yes       Cuff volume (mL): 8       Successful intubation technique: direct laryngoscopy       DL Blade Type: Weber 2       Grade View of Cords: 1       Adjucts: stylet       Position: Right       Measured from: lips       Secured at (cm): 21       Bite block used: None    Post intubation assessment        Placement verified by: capnometry, equal breath sounds and chest rise        Number of attempts at approach: 1       Number of other approaches attempted: 0       Secured with: silk tape       Ease of procedure: easy       Dentition: Intact       Dental guard used and removed.    Medication(s) Administered   Medication Administration Time: 12/8/2022 7:53 AM

## 2022-12-08 NOTE — INTERVAL H&P NOTE
I have reviewed the H&P and there are no changes. I discussed the surgical plan with the patient in pre-op, and all of her questions were answered. She desires to proceed with surgery.    Pippa Alatorre MD

## 2022-12-09 LAB
C TRACH DNA SPEC QL PROBE+SIG AMP: NEGATIVE
N GONORRHOEA DNA SPEC QL NAA+PROBE: NEGATIVE

## 2022-12-11 LAB — BACTERIA TISS BX CULT: ABNORMAL

## 2022-12-13 LAB
PATH REPORT.COMMENTS IMP SPEC: NORMAL
PATH REPORT.COMMENTS IMP SPEC: NORMAL
PATH REPORT.FINAL DX SPEC: NORMAL
PATH REPORT.GROSS SPEC: NORMAL
PATH REPORT.MICROSCOPIC SPEC OTHER STN: NORMAL
PATH REPORT.RELEVANT HX SPEC: NORMAL
PHOTO IMAGE: NORMAL

## 2022-12-14 LAB
M GENITALIUM DNA SPEC QL NAA+PROBE: NOT DETECTED
M HOMINIS DNA SPEC QL NAA+PROBE: NOT DETECTED
U PARVUM DNA SPEC QL NAA+PROBE: NOT DETECTED
U UREALYTICUM DNA SPEC QL NAA+PROBE: NOT DETECTED

## 2022-12-15 LAB — BACTERIA TISS BX CULT: ABNORMAL

## 2023-01-05 LAB — BACTERIA TISS BX CULT: NO GROWTH

## 2023-07-27 LAB
HEPATITIS B SURFACE ANTIGEN (EXTERNAL): NONREACTIVE
HEPATITIS C ANTIBODY (EXTERNAL): NONREACTIVE
HIV1+2 AB SERPL QL IA: NONREACTIVE
RUBELLA ANTIBODY IGG (EXTERNAL): NORMAL

## 2023-08-06 ENCOUNTER — HEALTH MAINTENANCE LETTER (OUTPATIENT)
Age: 38
End: 2023-08-06

## 2023-11-30 ENCOUNTER — TRANSFERRED RECORDS (OUTPATIENT)
Dept: HEALTH INFORMATION MANAGEMENT | Facility: CLINIC | Age: 38
End: 2023-11-30
Payer: COMMERCIAL

## 2023-12-13 ENCOUNTER — HOSPITAL ENCOUNTER (OUTPATIENT)
Facility: HOSPITAL | Age: 38
End: 2023-12-13
Admitting: FAMILY MEDICINE
Payer: COMMERCIAL

## 2023-12-13 ENCOUNTER — HOSPITAL ENCOUNTER (OUTPATIENT)
Facility: HOSPITAL | Age: 38
Discharge: HOME | End: 2023-12-13
Attending: FAMILY MEDICINE | Admitting: FAMILY MEDICINE
Payer: COMMERCIAL

## 2023-12-13 VITALS
DIASTOLIC BLOOD PRESSURE: 76 MMHG | HEART RATE: 99 BPM | TEMPERATURE: 98.4 F | SYSTOLIC BLOOD PRESSURE: 130 MMHG | RESPIRATION RATE: 16 BRPM

## 2023-12-13 PROBLEM — Z36.89 ENCOUNTER FOR TRIAGE IN PREGNANT PATIENT: Status: ACTIVE | Noted: 2023-12-13

## 2023-12-13 LAB
ALBUMIN UR-MCNC: 10 MG/DL
APPEARANCE UR: ABNORMAL
BACTERIA #/AREA URNS HPF: ABNORMAL /HPF
BILIRUB UR QL STRIP: NEGATIVE
CLUE CELLS: ABNORMAL
COLOR UR AUTO: YELLOW
GLUCOSE UR STRIP-MCNC: NEGATIVE MG/DL
HGB UR QL STRIP: NEGATIVE
KETONES UR STRIP-MCNC: 40 MG/DL
LEUKOCYTE ESTERASE UR QL STRIP: ABNORMAL
MUCOUS THREADS #/AREA URNS LPF: PRESENT /LPF
NITRATE UR QL: NEGATIVE
PH UR STRIP: 6 [PH] (ref 5–7)
RBC URINE: 2 /HPF
RUPTURE OF FETAL MEMBRANES BY ROM PLUS: NEGATIVE
SP GR UR STRIP: 1.02 (ref 1–1.03)
SQUAMOUS EPITHELIAL: 15 /HPF
TRICHOMONAS, WET PREP: ABNORMAL
UROBILINOGEN UR STRIP-MCNC: <2 MG/DL
WBC URINE: 27 /HPF
WBC'S/HIGH POWER FIELD, WET PREP: ABNORMAL
YEAST, WET PREP: ABNORMAL

## 2023-12-13 PROCEDURE — 81001 URINALYSIS AUTO W/SCOPE: CPT | Performed by: FAMILY MEDICINE

## 2023-12-13 PROCEDURE — 84112 EVAL AMNIOTIC FLUID PROTEIN: CPT | Performed by: FAMILY MEDICINE

## 2023-12-13 PROCEDURE — 87653 STREP B DNA AMP PROBE: CPT | Performed by: FAMILY MEDICINE

## 2023-12-13 PROCEDURE — 87210 SMEAR WET MOUNT SALINE/INK: CPT | Performed by: FAMILY MEDICINE

## 2023-12-13 PROCEDURE — 87086 URINE CULTURE/COLONY COUNT: CPT | Performed by: FAMILY MEDICINE

## 2023-12-13 RX ORDER — AMOXICILLIN 500 MG/1
500 TABLET, FILM COATED ORAL 3 TIMES DAILY
Status: ON HOLD | COMMUNITY
End: 2024-01-26

## 2023-12-13 RX ORDER — LIDOCAINE 40 MG/G
CREAM TOPICAL
Status: DISCONTINUED | OUTPATIENT
Start: 2023-12-13 | End: 2023-12-13 | Stop reason: HOSPADM

## 2023-12-13 ASSESSMENT — ACTIVITIES OF DAILY LIVING (ADL): ADLS_ACUITY_SCORE: 18

## 2023-12-13 NOTE — PROGRESS NOTES
Data: Patient assessed in the [facility] for cramping in back, uterine contractions and leaking vaginal fluid, in addition to spotting today at 1400. Cervix 0 cm dilated and 30% effaced. Outer os 1cm and inner os closed. Fetal station -2. Reviewed SVE with Dr. Han. Membranes intact. Contractions are not present. See flowsheets for fetal assessment documentation. Labs negative, reviewed with MD over the phone and with patient. Education completed regarding risks for  labor and need to stay well hydrated during pregnancy.     Action: Presumed adequate fetal oxygenation documented. Discharge instructions reviewed. Patient instructed to report change in fetal movement, vaginal leaking of fluid or bleeding, abdominal pain, or any concerns related to the pregnancy to provider/clinic.      Response: Orders to discharge home per Dr Han. Patient verbalized understanding of education and agreement with plan. Discharged to home at 1820.

## 2023-12-13 NOTE — PROGRESS NOTES
Data: Patient presented to [facility]: 2023  3:13 PM.  Reason for maternal/fetal assessment is uterine contractions, leaking vaginal fluid, vaginal bleeding, and pelvic pain. Patient reports that contractions/sensations of back labor get worse as the day goes on. Reports a dental infection requiring abx last week. Patient denies nausea, vomiting, headache, visual disturbances, epigastric or RUQ pain, significant edema. Denies sexual activity within the last week. Patient reports fetal movement is normal. Patient is a 30w5d .  Prenatal record reviewed. Pregnancy has been uncomplicated.    Vital signs wnl. Support person is not present.     Action: Verbal consent for EFM. Triage assessment completed.     Response: Patient verbalized agreement with plan. Contacted Dr Han with update and received orders for UAUC, ROM +, Group B strep, wet prep, and SVE.

## 2023-12-14 LAB
BACTERIA UR CULT: NORMAL
GP B STREP DNA SPEC QL NAA+PROBE: POSITIVE

## 2023-12-14 NOTE — DISCHARGE INSTRUCTIONS
Discharge Instruction for Undelivered Patients      You were seen for: Labor Assessment  We Consulted: Dr Rebecca Han  You had (Test or Medicine): Urine analysis, ROM +, Group B strep, wet prep, and sterile vaginal exam     Diet:   Drink 8 to 12 glasses of liquids (milk, juice, water) every day.  You may eat meals and snacks.     Activity:  Count fetal kicks everyday (see handout)  Call your doctor or nurse midwife if your baby is moving less than usual.     Call your provider if you notice:  Swelling in your face or increased swelling in your hands or legs.  Headaches that are not relieved by Tylenol (acetaminophen).  Changes in your vision (blurring: seeing spots or stars.)  Nausea (sick to your stomach) and vomiting (throwing up).   Weight gain of 5 pounds or more per week.  Heartburn that doesn't go away.  Signs of bladder infection: pain when you urinate (use the toilet), need to go more often and more urgently.  The bag of saucedo (rupture of membranes) breaks, or you notice leaking in your underwear.  Bright red blood in your underwear.  Abdominal (lower belly) or stomach pain.  Second (plus) baby: Contractions (tightening) less than 10 minutes apart and getting stronger.  *If less than 34 weeks: Contractions (tightening) more than 6 times in one hour.  Increase or change in vaginal discharge (note the color and amount)      Follow-up:  As scheduled in the clinic

## 2024-01-25 ENCOUNTER — TRANSFERRED RECORDS (OUTPATIENT)
Dept: HEALTH INFORMATION MANAGEMENT | Facility: CLINIC | Age: 39
End: 2024-01-25
Payer: COMMERCIAL

## 2024-01-26 ENCOUNTER — HOSPITAL ENCOUNTER (INPATIENT)
Facility: HOSPITAL | Age: 39
LOS: 4 days | Discharge: HOME | End: 2024-01-30
Attending: FAMILY MEDICINE | Admitting: FAMILY MEDICINE
Payer: COMMERCIAL

## 2024-01-26 ENCOUNTER — TRANSFERRED RECORDS (OUTPATIENT)
Dept: HEALTH INFORMATION MANAGEMENT | Facility: CLINIC | Age: 39
End: 2024-01-26
Payer: COMMERCIAL

## 2024-01-26 DIAGNOSIS — O13.3 GESTATIONAL HYPERTENSION, THIRD TRIMESTER: Primary | ICD-10-CM

## 2024-01-26 DIAGNOSIS — F41.9 ANXIETY: ICD-10-CM

## 2024-01-26 DIAGNOSIS — Z87.59 HISTORY OF POSTPARTUM DEPRESSION: Chronic | ICD-10-CM

## 2024-01-26 DIAGNOSIS — Z86.59 HISTORY OF POSTPARTUM DEPRESSION: Chronic | ICD-10-CM

## 2024-01-26 PROBLEM — N80.9 ENDOMETRIOSIS DETERMINED BY LAPAROSCOPY: Status: RESOLVED | Noted: 2022-12-08 | Resolved: 2024-01-26

## 2024-01-26 PROBLEM — Z34.90 PREGNANT: Status: ACTIVE | Noted: 2021-06-11

## 2024-01-26 PROBLEM — O13.9 GESTATIONAL HYPERTENSION: Status: ACTIVE | Noted: 2024-01-26

## 2024-01-26 PROBLEM — N96 RECURRENT PREGNANCY LOSS: Status: RESOLVED | Noted: 2021-06-11 | Resolved: 2024-01-26

## 2024-01-26 PROBLEM — N80.109 ENDOMETRIOSIS, OVARY: Status: RESOLVED | Noted: 2022-12-08 | Resolved: 2024-01-26

## 2024-01-26 PROBLEM — N72 ENDOCERVICITIS: Status: RESOLVED | Noted: 2022-12-08 | Resolved: 2024-01-26

## 2024-01-26 PROBLEM — N71.1 CHRONIC ENDOMETRITIS: Status: RESOLVED | Noted: 2022-12-08 | Resolved: 2024-01-26

## 2024-01-26 PROBLEM — Z36.89 ENCOUNTER FOR TRIAGE IN PREGNANT PATIENT: Status: RESOLVED | Noted: 2023-12-13 | Resolved: 2024-01-26

## 2024-01-26 PROBLEM — O09.529 AMA (ADVANCED MATERNAL AGE) MULTIGRAVIDA 35+: Status: ACTIVE | Noted: 2024-01-26

## 2024-01-26 PROBLEM — E28.2 POLYCYSTIC OVARIES: Status: RESOLVED | Noted: 2022-12-08 | Resolved: 2024-01-26

## 2024-01-26 PROBLEM — N80.00 ENDOMETRIOSIS, UTERUS: Status: RESOLVED | Noted: 2022-12-08 | Resolved: 2024-01-26

## 2024-01-26 PROBLEM — N73.6 PELVIC ADHESIONS: Status: RESOLVED | Noted: 2022-12-08 | Resolved: 2024-01-26

## 2024-01-26 LAB
ABO/RH(D): NORMAL
ALT SERPL W P-5'-P-CCNC: 10 U/L (ref 0–50)
ANION GAP SERPL CALCULATED.3IONS-SCNC: 12 MMOL/L (ref 7–15)
ANTIBODY SCREEN: NEGATIVE
AST SERPL W P-5'-P-CCNC: 17 U/L (ref 0–45)
BUN SERPL-MCNC: 12.6 MG/DL (ref 6–20)
CALCIUM SERPL-MCNC: 9.1 MG/DL (ref 8.6–10)
CHLORIDE SERPL-SCNC: 102 MMOL/L (ref 98–107)
CREAT SERPL-MCNC: 0.75 MG/DL (ref 0.51–0.95)
DEPRECATED HCO3 PLAS-SCNC: 21 MMOL/L (ref 22–29)
EGFRCR SERPLBLD CKD-EPI 2021: >90 ML/MIN/1.73M2
ERYTHROCYTE [DISTWIDTH] IN BLOOD BY AUTOMATED COUNT: 13.5 % (ref 10–15)
GLUCOSE SERPL-MCNC: 90 MG/DL (ref 70–99)
HCT VFR BLD AUTO: 35.6 % (ref 35–47)
HGB BLD-MCNC: 11.2 G/DL (ref 11.7–15.7)
MCH RBC QN AUTO: 25.9 PG (ref 26.5–33)
MCHC RBC AUTO-ENTMCNC: 31.5 G/DL (ref 31.5–36.5)
MCV RBC AUTO: 82 FL (ref 78–100)
PLATELET # BLD AUTO: 297 10E3/UL (ref 150–450)
POTASSIUM SERPL-SCNC: 3.8 MMOL/L (ref 3.4–5.3)
RBC # BLD AUTO: 4.33 10E6/UL (ref 3.8–5.2)
SODIUM SERPL-SCNC: 135 MMOL/L (ref 135–145)
SPECIMEN EXPIRATION DATE: NORMAL
WBC # BLD AUTO: 10.2 10E3/UL (ref 4–11)

## 2024-01-26 PROCEDURE — 86900 BLOOD TYPING SEROLOGIC ABO: CPT | Performed by: FAMILY MEDICINE

## 2024-01-26 PROCEDURE — 80048 BASIC METABOLIC PNL TOTAL CA: CPT | Performed by: FAMILY MEDICINE

## 2024-01-26 PROCEDURE — 250N000013 HC RX MED GY IP 250 OP 250 PS 637: Performed by: FAMILY MEDICINE

## 2024-01-26 PROCEDURE — 85027 COMPLETE CBC AUTOMATED: CPT | Performed by: FAMILY MEDICINE

## 2024-01-26 PROCEDURE — 84460 ALANINE AMINO (ALT) (SGPT): CPT | Performed by: FAMILY MEDICINE

## 2024-01-26 PROCEDURE — 120N000001 HC R&B MED SURG/OB

## 2024-01-26 PROCEDURE — 84450 TRANSFERASE (AST) (SGOT): CPT | Performed by: FAMILY MEDICINE

## 2024-01-26 PROCEDURE — 86780 TREPONEMA PALLIDUM: CPT | Performed by: FAMILY MEDICINE

## 2024-01-26 PROCEDURE — 3E0P7VZ INTRODUCTION OF HORMONE INTO FEMALE REPRODUCTIVE, VIA NATURAL OR ARTIFICIAL OPENING: ICD-10-PCS | Performed by: FAMILY MEDICINE

## 2024-01-26 PROCEDURE — 36415 COLL VENOUS BLD VENIPUNCTURE: CPT | Performed by: FAMILY MEDICINE

## 2024-01-26 RX ORDER — LEVOTHYROXINE SODIUM 25 UG/1
25 TABLET ORAL
Status: DISCONTINUED | OUTPATIENT
Start: 2024-01-27 | End: 2024-01-30 | Stop reason: HOSPADM

## 2024-01-26 RX ORDER — OXYTOCIN 10 [USP'U]/ML
10 INJECTION, SOLUTION INTRAMUSCULAR; INTRAVENOUS
Status: DISCONTINUED | OUTPATIENT
Start: 2024-01-26 | End: 2024-01-29 | Stop reason: HOSPADM

## 2024-01-26 RX ORDER — METHYLERGONOVINE MALEATE 0.2 MG/ML
200 INJECTION INTRAVENOUS
Status: DISCONTINUED | OUTPATIENT
Start: 2024-01-26 | End: 2024-01-29 | Stop reason: HOSPADM

## 2024-01-26 RX ORDER — TRANEXAMIC ACID 10 MG/ML
1 INJECTION, SOLUTION INTRAVENOUS EVERY 30 MIN PRN
Status: DISCONTINUED | OUTPATIENT
Start: 2024-01-26 | End: 2024-01-29 | Stop reason: HOSPADM

## 2024-01-26 RX ORDER — ONDANSETRON 4 MG/1
4 TABLET, ORALLY DISINTEGRATING ORAL EVERY 6 HOURS PRN
Status: DISCONTINUED | OUTPATIENT
Start: 2024-01-26 | End: 2024-01-29 | Stop reason: HOSPADM

## 2024-01-26 RX ORDER — SODIUM CHLORIDE, SODIUM LACTATE, POTASSIUM CHLORIDE, CALCIUM CHLORIDE 600; 310; 30; 20 MG/100ML; MG/100ML; MG/100ML; MG/100ML
INJECTION, SOLUTION INTRAVENOUS CONTINUOUS
Status: DISCONTINUED | OUTPATIENT
Start: 2024-01-26 | End: 2024-01-29 | Stop reason: HOSPADM

## 2024-01-26 RX ORDER — PROCHLORPERAZINE MALEATE 10 MG
10 TABLET ORAL EVERY 6 HOURS PRN
Status: DISCONTINUED | OUTPATIENT
Start: 2024-01-26 | End: 2024-01-29 | Stop reason: HOSPADM

## 2024-01-26 RX ORDER — TRAZODONE HYDROCHLORIDE 50 MG/1
50 TABLET, FILM COATED ORAL AT BEDTIME
COMMUNITY

## 2024-01-26 RX ORDER — CITRIC ACID/SODIUM CITRATE 334-500MG
30 SOLUTION, ORAL ORAL
Status: DISCONTINUED | OUTPATIENT
Start: 2024-01-26 | End: 2024-01-29 | Stop reason: HOSPADM

## 2024-01-26 RX ORDER — CARBOPROST TROMETHAMINE 250 UG/ML
250 INJECTION, SOLUTION INTRAMUSCULAR
Status: DISCONTINUED | OUTPATIENT
Start: 2024-01-26 | End: 2024-01-29 | Stop reason: HOSPADM

## 2024-01-26 RX ORDER — PROCHLORPERAZINE 25 MG
25 SUPPOSITORY, RECTAL RECTAL EVERY 12 HOURS PRN
Status: DISCONTINUED | OUTPATIENT
Start: 2024-01-26 | End: 2024-01-29 | Stop reason: HOSPADM

## 2024-01-26 RX ORDER — KETOROLAC TROMETHAMINE 30 MG/ML
30 INJECTION, SOLUTION INTRAMUSCULAR; INTRAVENOUS
Status: DISCONTINUED | OUTPATIENT
Start: 2024-01-26 | End: 2024-01-30 | Stop reason: HOSPADM

## 2024-01-26 RX ORDER — NALOXONE HYDROCHLORIDE 0.4 MG/ML
0.2 INJECTION, SOLUTION INTRAMUSCULAR; INTRAVENOUS; SUBCUTANEOUS
Status: DISCONTINUED | OUTPATIENT
Start: 2024-01-26 | End: 2024-01-29 | Stop reason: HOSPADM

## 2024-01-26 RX ORDER — OXYTOCIN/0.9 % SODIUM CHLORIDE 30/500 ML
100-340 PLASTIC BAG, INJECTION (ML) INTRAVENOUS CONTINUOUS PRN
Status: DISCONTINUED | OUTPATIENT
Start: 2024-01-26 | End: 2024-01-30 | Stop reason: HOSPADM

## 2024-01-26 RX ORDER — HYDROXYZINE HYDROCHLORIDE 50 MG/1
50 TABLET, FILM COATED ORAL
Status: DISCONTINUED | OUTPATIENT
Start: 2024-01-26 | End: 2024-01-29 | Stop reason: HOSPADM

## 2024-01-26 RX ORDER — PENICILLIN G POTASSIUM 5000000 [IU]/1
5 INJECTION, POWDER, FOR SOLUTION INTRAMUSCULAR; INTRAVENOUS ONCE
Status: COMPLETED | OUTPATIENT
Start: 2024-01-26 | End: 2024-01-28

## 2024-01-26 RX ORDER — NALOXONE HYDROCHLORIDE 0.4 MG/ML
0.4 INJECTION, SOLUTION INTRAMUSCULAR; INTRAVENOUS; SUBCUTANEOUS
Status: DISCONTINUED | OUTPATIENT
Start: 2024-01-26 | End: 2024-01-29 | Stop reason: HOSPADM

## 2024-01-26 RX ORDER — TERBUTALINE SULFATE 1 MG/ML
0.25 INJECTION, SOLUTION SUBCUTANEOUS
Status: DISCONTINUED | OUTPATIENT
Start: 2024-01-26 | End: 2024-01-29 | Stop reason: HOSPADM

## 2024-01-26 RX ORDER — MISOPROSTOL 200 UG/1
800 TABLET ORAL
Status: DISCONTINUED | OUTPATIENT
Start: 2024-01-26 | End: 2024-01-29 | Stop reason: HOSPADM

## 2024-01-26 RX ORDER — IBUPROFEN 800 MG/1
800 TABLET, FILM COATED ORAL
Status: DISCONTINUED | OUTPATIENT
Start: 2024-01-26 | End: 2024-01-30 | Stop reason: HOSPADM

## 2024-01-26 RX ORDER — MISOPROSTOL 200 UG/1
400 TABLET ORAL
Status: DISCONTINUED | OUTPATIENT
Start: 2024-01-26 | End: 2024-01-29 | Stop reason: HOSPADM

## 2024-01-26 RX ORDER — LOPERAMIDE HCL 2 MG
4 CAPSULE ORAL
Status: DISCONTINUED | OUTPATIENT
Start: 2024-01-26 | End: 2024-01-29 | Stop reason: HOSPADM

## 2024-01-26 RX ORDER — ONDANSETRON 2 MG/ML
4 INJECTION INTRAMUSCULAR; INTRAVENOUS EVERY 6 HOURS PRN
Status: DISCONTINUED | OUTPATIENT
Start: 2024-01-26 | End: 2024-01-29 | Stop reason: HOSPADM

## 2024-01-26 RX ORDER — TRAZODONE HYDROCHLORIDE 50 MG/1
50 TABLET, FILM COATED ORAL AT BEDTIME
Status: DISCONTINUED | OUTPATIENT
Start: 2024-01-26 | End: 2024-01-30 | Stop reason: HOSPADM

## 2024-01-26 RX ORDER — OXYTOCIN/0.9 % SODIUM CHLORIDE 30/500 ML
340 PLASTIC BAG, INJECTION (ML) INTRAVENOUS CONTINUOUS PRN
Status: DISCONTINUED | OUTPATIENT
Start: 2024-01-26 | End: 2024-01-29 | Stop reason: HOSPADM

## 2024-01-26 RX ORDER — PENICILLIN G 3000000 [IU]/50ML
3 INJECTION, SOLUTION INTRAVENOUS EVERY 4 HOURS
Status: DISCONTINUED | OUTPATIENT
Start: 2024-01-27 | End: 2024-01-29 | Stop reason: HOSPADM

## 2024-01-26 RX ORDER — OXYTOCIN 10 [USP'U]/ML
10 INJECTION, SOLUTION INTRAMUSCULAR; INTRAVENOUS
Status: DISCONTINUED | OUTPATIENT
Start: 2024-01-26 | End: 2024-01-30 | Stop reason: HOSPADM

## 2024-01-26 RX ORDER — LEVOTHYROXINE SODIUM 25 UG/1
25 TABLET ORAL DAILY
COMMUNITY

## 2024-01-26 RX ORDER — LOPERAMIDE HCL 2 MG
2 CAPSULE ORAL
Status: DISCONTINUED | OUTPATIENT
Start: 2024-01-26 | End: 2024-01-29 | Stop reason: HOSPADM

## 2024-01-26 RX ADMIN — DINOPROSTONE 10 MG: 10 INSERT VAGINAL at 21:11

## 2024-01-26 RX ADMIN — TRAZODONE HYDROCHLORIDE 50 MG: 50 TABLET ORAL at 22:44

## 2024-01-26 ASSESSMENT — ACTIVITIES OF DAILY LIVING (ADL)
ADLS_ACUITY_SCORE: 35
ADLS_ACUITY_SCORE: 20
ADLS_ACUITY_SCORE: 20

## 2024-01-27 LAB — T PALLIDUM AB SER QL: NONREACTIVE

## 2024-01-27 PROCEDURE — 250N000011 HC RX IP 250 OP 636: Performed by: FAMILY MEDICINE

## 2024-01-27 PROCEDURE — 250N000013 HC RX MED GY IP 250 OP 250 PS 637: Performed by: FAMILY MEDICINE

## 2024-01-27 PROCEDURE — 120N000001 HC R&B MED SURG/OB

## 2024-01-27 RX ORDER — MORPHINE SULFATE 10 MG/ML
10 INJECTION, SOLUTION INTRAMUSCULAR; INTRAVENOUS
Status: DISCONTINUED | OUTPATIENT
Start: 2024-01-27 | End: 2024-01-30 | Stop reason: HOSPADM

## 2024-01-27 RX ORDER — NIFEDIPINE 10 MG/1
10-20 CAPSULE ORAL
Status: DISCONTINUED | OUTPATIENT
Start: 2024-01-27 | End: 2024-01-30 | Stop reason: HOSPADM

## 2024-01-27 RX ORDER — CALCIUM CARBONATE 500 MG/1
500 TABLET, CHEWABLE ORAL 3 TIMES DAILY PRN
Status: DISCONTINUED | OUTPATIENT
Start: 2024-01-27 | End: 2024-01-30 | Stop reason: HOSPADM

## 2024-01-27 RX ORDER — DULOXETIN HYDROCHLORIDE 60 MG/1
120 CAPSULE, DELAYED RELEASE ORAL AT BEDTIME
Status: DISCONTINUED | OUTPATIENT
Start: 2024-01-27 | End: 2024-01-30 | Stop reason: HOSPADM

## 2024-01-27 RX ORDER — MORPHINE SULFATE 10 MG/ML
10 INJECTION, SOLUTION INTRAMUSCULAR; INTRAVENOUS
Status: DISCONTINUED | OUTPATIENT
Start: 2024-01-27 | End: 2024-01-27

## 2024-01-27 RX ORDER — LIDOCAINE 40 MG/G
CREAM TOPICAL
Status: DISCONTINUED | OUTPATIENT
Start: 2024-01-27 | End: 2024-01-30 | Stop reason: HOSPADM

## 2024-01-27 RX ORDER — LABETALOL HYDROCHLORIDE 5 MG/ML
20 INJECTION, SOLUTION INTRAVENOUS
Status: DISCONTINUED | OUTPATIENT
Start: 2024-01-27 | End: 2024-01-30 | Stop reason: HOSPADM

## 2024-01-27 RX ORDER — MISOPROSTOL 100 UG/1
25 TABLET ORAL
Status: COMPLETED | OUTPATIENT
Start: 2024-01-27 | End: 2024-01-28

## 2024-01-27 RX ORDER — ACETAMINOPHEN 325 MG/1
650 TABLET ORAL EVERY 4 HOURS PRN
Status: DISCONTINUED | OUTPATIENT
Start: 2024-01-27 | End: 2024-01-30 | Stop reason: HOSPADM

## 2024-01-27 RX ORDER — DULOXETIN HYDROCHLORIDE 60 MG/1
120 CAPSULE, DELAYED RELEASE ORAL DAILY
Status: DISCONTINUED | OUTPATIENT
Start: 2024-01-27 | End: 2024-01-27

## 2024-01-27 RX ADMIN — ACETAMINOPHEN 650 MG: 325 TABLET ORAL at 09:37

## 2024-01-27 RX ADMIN — LEVOTHYROXINE SODIUM 25 MCG: 0.03 TABLET ORAL at 08:03

## 2024-01-27 RX ADMIN — MISOPROSTOL 25 MCG: 100 TABLET ORAL at 17:15

## 2024-01-27 RX ADMIN — ANTACID TABLETS 500 MG: 500 TABLET, CHEWABLE ORAL at 23:47

## 2024-01-27 RX ADMIN — MISOPROSTOL 25 MCG: 100 TABLET ORAL at 19:33

## 2024-01-27 RX ADMIN — MISOPROSTOL 25 MCG: 100 TABLET ORAL at 13:01

## 2024-01-27 RX ADMIN — SALINE NASAL SPRAY 1 SPRAY: 1.5 SOLUTION NASAL at 17:18

## 2024-01-27 RX ADMIN — SALINE NASAL SPRAY 1 SPRAY: 1.5 SOLUTION NASAL at 11:00

## 2024-01-27 RX ADMIN — HYDROXYZINE HYDROCHLORIDE 50 MG: 50 TABLET, FILM COATED ORAL at 22:42

## 2024-01-27 RX ADMIN — MORPHINE SULFATE 10 MG: 10 INJECTION, SOLUTION INTRAMUSCULAR; INTRAVENOUS at 22:49

## 2024-01-27 RX ADMIN — MISOPROSTOL 25 MCG: 100 TABLET ORAL at 11:00

## 2024-01-27 RX ADMIN — MISOPROSTOL 25 MCG: 100 TABLET ORAL at 15:01

## 2024-01-27 RX ADMIN — MISOPROSTOL 25 MCG: 100 TABLET ORAL at 22:40

## 2024-01-27 RX ADMIN — DULOXETINE HYDROCHLORIDE 120 MG: 60 CAPSULE, DELAYED RELEASE PELLETS ORAL at 19:34

## 2024-01-27 ASSESSMENT — ACTIVITIES OF DAILY LIVING (ADL)
ADLS_ACUITY_SCORE: 20

## 2024-01-27 NOTE — PLAN OF CARE
1900- resumed care, pt in room, pt is an induction of labor for HTN. Pt oriented to room. Assessment, vitals and admission done.   2000- Dr mitchell notified of patients arrival, placed orders for cervidil and labs   2050- Hospital resident paged and came to bedside to confirm vertex before placement of cervidil- placement confirmed   2111- cervidil placed, SVE fingertip/40/-3

## 2024-01-27 NOTE — H&P
"[facility] Labor and Delivery History and Physical    Sweta Carlin MRN# [id number]   Age: 38 year old YOB: 1985     Date of Admission:  2024    Primary care provider: Rebecca Han MD           Chief Complaint:   Sweta Carlin is a 38 year old female who is 37w1d pregnant and being admitted for induction of labor, indication gestational HTN. Pt presented for prenatal visit on 24 after missing her 34 week appt. due to illness. She was noting HA, swelling and some mild SOB. Her BP numbers had several > 140/90 in the week prior. Discussed with the pt and her  rec for induction at 37 weeks due to gestational HTN.          Pregnancy history:     OBSTETRIC HISTORY:    OB History    Para Term  AB Living   6 2 2 0 3 2   SAB IAB Ectopic Multiple Live Births   3 0 0 0 2      # Outcome Date GA Lbr Delta/2nd Weight Sex Delivery Anes PTL Lv   6 Current            5 Term 21 39w0d 05:09 / 00:10 3.9 kg (8 lb 9.6 oz) F Vag-Spont Local, EPI, Nitrous N [place]      Name: ISABELFEMALE-SWETA      Apgar1: 8  Apgar5: 9   4 2019           3 2018           2 2018           1 Term 04/10/15 40w1d 05:01 :13 3.487 kg (7 lb 11 oz) M Vag-Spont None N [place]      Name: ISABELMALE-SWETA      Apgar1: 8  Apgar5: 9       EDC: Estimated Date of Delivery: 24    Prenatal Labs:   Lab Results   Component Value Date    AS Negative 2024    HEPBANG Negative 2014    HGB 11.2 (L) 2024       GBS Status:   No results found for: \"GBS\" pending from 24    Active Problem List  Patient Active Problem List   Diagnosis     (normal spontaneous vaginal delivery)    Anxiety    39 weeks gestation of pregnancy    Pregnant    Depression    AMA (advanced maternal age) multigravida 35+    Gestational hypertension       Medication Prior to Admission  Medications Prior to Admission   Medication Sig Dispense Refill Last Dose    DULoxetine (CYMBALTA) " 30 MG capsule Take 120 mg by mouth daily   1/25/2024 at 6pm    FERROUS GLUCONATE PO Take 10 mg by mouth At Bedtime   1/25/2024    levothyroxine (SYNTHROID/LEVOTHROID) 25 MCG tablet Take 25 mcg by mouth daily   1/25/2024 at 0500    Prenatal Vit-Fe Fumarate-FA (PRENATAL MULTIVITAMIN  PLUS IRON) 27-1 MG TABS Take 1 tablet by mouth daily   1/25/2024    traZODone (DESYREL) 50 MG tablet Take 50 mg by mouth at bedtime   1/25/2024   .        Maternal Past Medical History:     Past Medical History:   Diagnosis Date    ADHD (attention deficit hyperactivity disorder)     Endometriosis     Female infertility     frequent losses, pt on blood thinners.    Hx of tonsillectomy     Irregular menstrual cycle     Mental disorder     hx of anxiety prior to pregnancy. Off zoloft prior to pregnancy    Migraines     Other and unspecified ovarian cyst     Other malaise and fatigue     PCOS (polycystic ovarian syndrome)     PONV (postoperative nausea and vomiting)     with tonsillectomy    Postpartum depression     hospitalized for 5 days    Pregnant state, incidental                        Family History:   I have reviewed this patient's family history            Social History:   I have reviewed this patient's social history         Review of Systems:   At admission c/o HA, swelling, SOB          Physical Exam:   Vitals were reviewed  Abdomen:   Gravid NT      Cervix:   Membranes: intact   Dilation: 2   Effacement: 80%   Station:-3   Consistency: firm   Position: Posterior  Presentation:Vertex  Fetal Heart Rate Tracing: Tier 2 (indeterminate) prolonged decel noted by RN this AM otherwise category 1  Tocometer: external monitor                       Assessment:   Mary Carlin is a 37w1d pregnant female admitted with induction of labor, indication gestational HTN. Labs normal in clinic and at admission. Symptoms dramatically improved since admission per pt report..          Plan:   Admit - see IP orders    CAMPOS SIBLEY,  MD  1/27/2024

## 2024-01-27 NOTE — PLAN OF CARE
Problem: Labor  Goal: Acceptable Pain Control  Outcome: Progressing   Goal Outcome Evaluation:      Plan of Care Reviewed With: patient    Overall Patient Progress: improvingOverall Patient Progress: improving     Patient does have a lingering headache, pt coping well with contractions.

## 2024-01-27 NOTE — PROGRESS NOTES
Cervidil removed at 0909, SVE 1cm/40%/-3, posterior, medium consistency, ballotable. Dr. Dumont updated via phone, discussed SVE, contractions and FHR tracing. Plan is to continue with cervical ripening, but switch to PO Cytotec. Patient agreeable to plan. Monitors removed per patient request to shower prior to start Cytotec.

## 2024-01-27 NOTE — PROGRESS NOTES
At 1113 FHR 145bpm with moderate variability and accelerations and then FHR slowly decreased to 125 bpm over about 2 minutes. And then decreased again at 1116 down to 130 bpm for about 60 seconds. [place] not tracing contractions consistently and no contractions visible on monitor during decelerations.

## 2024-01-27 NOTE — PLAN OF CARE
-FHT cat 1. Occasional contractions on toco, patient unaware and sleeping throughout.   -Reports fetal movement. Denies LOF.   - Denies pre-e symptoms, LS clear, normal reflexes, no clonus, voiding.

## 2024-01-27 NOTE — PROGRESS NOTES
Met with pt. She is feeling much better than yesterday with less HA and swelling. Notes breathing is easier. See exam per H&P. Discussed process of induction. I noted definite cvx change from my exam on 1/25/24. Continue cytotec. Hypertensive order set entered. Recheck labs every day.

## 2024-01-28 ENCOUNTER — ANESTHESIA (OUTPATIENT)
Dept: OBGYN | Facility: HOSPITAL | Age: 39
End: 2024-01-28
Payer: COMMERCIAL

## 2024-01-28 ENCOUNTER — ANESTHESIA EVENT (OUTPATIENT)
Dept: OBGYN | Facility: HOSPITAL | Age: 39
End: 2024-01-28
Payer: COMMERCIAL

## 2024-01-28 LAB
ALT SERPL W P-5'-P-CCNC: 10 U/L (ref 0–50)
AST SERPL W P-5'-P-CCNC: 19 U/L (ref 0–45)
CREAT SERPL-MCNC: 0.85 MG/DL (ref 0.51–0.95)
EGFRCR SERPLBLD CKD-EPI 2021: 89 ML/MIN/1.73M2
HGB BLD-MCNC: 10.6 G/DL (ref 11.7–15.7)
PLATELET # BLD AUTO: 260 10E3/UL (ref 150–450)

## 2024-01-28 PROCEDURE — 84460 ALANINE AMINO (ALT) (SGPT): CPT | Performed by: FAMILY MEDICINE

## 2024-01-28 PROCEDURE — 3E033VJ INTRODUCTION OF OTHER HORMONE INTO PERIPHERAL VEIN, PERCUTANEOUS APPROACH: ICD-10-PCS | Performed by: FAMILY MEDICINE

## 2024-01-28 PROCEDURE — 10907ZC DRAINAGE OF AMNIOTIC FLUID, THERAPEUTIC FROM PRODUCTS OF CONCEPTION, VIA NATURAL OR ARTIFICIAL OPENING: ICD-10-PCS | Performed by: FAMILY MEDICINE

## 2024-01-28 PROCEDURE — 3E0R3BZ INTRODUCTION OF ANESTHETIC AGENT INTO SPINAL CANAL, PERCUTANEOUS APPROACH: ICD-10-PCS | Performed by: ANESTHESIOLOGY

## 2024-01-28 PROCEDURE — 250N000011 HC RX IP 250 OP 636: Performed by: ANESTHESIOLOGY

## 2024-01-28 PROCEDURE — 85049 AUTOMATED PLATELET COUNT: CPT | Performed by: FAMILY MEDICINE

## 2024-01-28 PROCEDURE — 84450 TRANSFERASE (AST) (SGOT): CPT | Performed by: FAMILY MEDICINE

## 2024-01-28 PROCEDURE — 258N000003 HC RX IP 258 OP 636: Performed by: FAMILY MEDICINE

## 2024-01-28 PROCEDURE — 00HU33Z INSERTION OF INFUSION DEVICE INTO SPINAL CANAL, PERCUTANEOUS APPROACH: ICD-10-PCS | Performed by: ANESTHESIOLOGY

## 2024-01-28 PROCEDURE — 85018 HEMOGLOBIN: CPT | Performed by: FAMILY MEDICINE

## 2024-01-28 PROCEDURE — 120N000001 HC R&B MED SURG/OB

## 2024-01-28 PROCEDURE — 370N000003 HC ANESTHESIA WARD SERVICE: Performed by: ANESTHESIOLOGY

## 2024-01-28 PROCEDURE — 36415 COLL VENOUS BLD VENIPUNCTURE: CPT | Performed by: FAMILY MEDICINE

## 2024-01-28 PROCEDURE — 250N000011 HC RX IP 250 OP 636: Performed by: FAMILY MEDICINE

## 2024-01-28 PROCEDURE — 82565 ASSAY OF CREATININE: CPT | Performed by: FAMILY MEDICINE

## 2024-01-28 PROCEDURE — 250N000009 HC RX 250: Performed by: FAMILY MEDICINE

## 2024-01-28 PROCEDURE — 250N000013 HC RX MED GY IP 250 OP 250 PS 637: Performed by: FAMILY MEDICINE

## 2024-01-28 RX ORDER — NALBUPHINE HYDROCHLORIDE 20 MG/ML
2.5-5 INJECTION, SOLUTION INTRAMUSCULAR; INTRAVENOUS; SUBCUTANEOUS EVERY 6 HOURS PRN
Status: DISCONTINUED | OUTPATIENT
Start: 2024-01-28 | End: 2024-01-30 | Stop reason: HOSPADM

## 2024-01-28 RX ORDER — BISACODYL 10 MG
10 SUPPOSITORY, RECTAL RECTAL ONCE
Status: COMPLETED | OUTPATIENT
Start: 2024-01-28 | End: 2024-01-28

## 2024-01-28 RX ORDER — FENTANYL/ROPIVACAINE/NS/PF 2MCG/ML-.1
PLASTIC BAG, INJECTION (ML) EPIDURAL
Status: DISCONTINUED | OUTPATIENT
Start: 2024-01-28 | End: 2024-01-29 | Stop reason: HOSPADM

## 2024-01-28 RX ORDER — LIDOCAINE 40 MG/G
CREAM TOPICAL
Status: DISCONTINUED | OUTPATIENT
Start: 2024-01-28 | End: 2024-01-29 | Stop reason: HOSPADM

## 2024-01-28 RX ORDER — OXYTOCIN/0.9 % SODIUM CHLORIDE 30/500 ML
1-24 PLASTIC BAG, INJECTION (ML) INTRAVENOUS CONTINUOUS
Status: DISCONTINUED | OUTPATIENT
Start: 2024-01-28 | End: 2024-01-29 | Stop reason: HOSPADM

## 2024-01-28 RX ORDER — SODIUM CHLORIDE, SODIUM LACTATE, POTASSIUM CHLORIDE, CALCIUM CHLORIDE 600; 310; 30; 20 MG/100ML; MG/100ML; MG/100ML; MG/100ML
INJECTION, SOLUTION INTRAVENOUS CONTINUOUS PRN
Status: DISCONTINUED | OUTPATIENT
Start: 2024-01-28 | End: 2024-01-29 | Stop reason: HOSPADM

## 2024-01-28 RX ORDER — BUPIVACAINE HYDROCHLORIDE 2.5 MG/ML
INJECTION, SOLUTION EPIDURAL; INFILTRATION; INTRACAUDAL
Status: COMPLETED | OUTPATIENT
Start: 2024-01-28 | End: 2024-01-28

## 2024-01-28 RX ORDER — EPHEDRINE SULFATE 50 MG/ML
5 INJECTION, SOLUTION INTRAMUSCULAR; INTRAVENOUS; SUBCUTANEOUS
Status: DISCONTINUED | OUTPATIENT
Start: 2024-01-28 | End: 2024-01-29 | Stop reason: HOSPADM

## 2024-01-28 RX ADMIN — DULOXETINE HYDROCHLORIDE 120 MG: 60 CAPSULE, DELAYED RELEASE PELLETS ORAL at 19:29

## 2024-01-28 RX ADMIN — MISOPROSTOL 25 MCG: 100 TABLET ORAL at 05:03

## 2024-01-28 RX ADMIN — MISOPROSTOL 25 MCG: 100 TABLET ORAL at 00:54

## 2024-01-28 RX ADMIN — PENICILLIN G POTASSIUM 5 MILLION UNITS: 5000000 POWDER, FOR SOLUTION INTRAMUSCULAR; INTRAPLEURAL; INTRATHECAL; INTRAVENOUS at 18:55

## 2024-01-28 RX ADMIN — MISOPROSTOL 25 MCG: 100 TABLET ORAL at 07:04

## 2024-01-28 RX ADMIN — Medication: at 23:43

## 2024-01-28 RX ADMIN — MISOPROSTOL 25 MCG: 100 TABLET ORAL at 02:59

## 2024-01-28 RX ADMIN — MISOPROSTOL 25 MCG: 100 TABLET ORAL at 09:33

## 2024-01-28 RX ADMIN — BISACODYL 10 MG: 10 SUPPOSITORY RECTAL at 13:56

## 2024-01-28 RX ADMIN — LEVOTHYROXINE SODIUM 25 MCG: 0.03 TABLET ORAL at 07:59

## 2024-01-28 RX ADMIN — Medication 2 MILLI-UNITS/MIN: at 16:19

## 2024-01-28 RX ADMIN — SODIUM CHLORIDE, POTASSIUM CHLORIDE, SODIUM LACTATE AND CALCIUM CHLORIDE: 600; 310; 30; 20 INJECTION, SOLUTION INTRAVENOUS at 16:14

## 2024-01-28 RX ADMIN — BUPIVACAINE HYDROCHLORIDE 10 ML: 2.5 INJECTION, SOLUTION EPIDURAL; INFILTRATION; INTRACAUDAL at 11:47

## 2024-01-28 RX ADMIN — MISOPROSTOL 25 MCG: 100 TABLET ORAL at 11:56

## 2024-01-28 RX ADMIN — ANTACID TABLETS 500 MG: 500 TABLET, CHEWABLE ORAL at 02:59

## 2024-01-28 ASSESSMENT — ACTIVITIES OF DAILY LIVING (ADL)
ADLS_ACUITY_SCORE: 20

## 2024-01-28 NOTE — PLAN OF CARE
-FHT Cat 1. Contractions irregular, difficulty monitoring when patient laying on side, has only felt 2 cx since 10pm last night.    -1 severe range BP at 4am, repeats WNL, denies pre-e symptoms, reflexes brisk  -

## 2024-01-28 NOTE — PROGRESS NOTES
RN spoke with Dr. Han regarding pt's request for bisacodyl suppository. Per pt she would like to receive the suppository, have a BM and shower before nect SVE. This plan ok per MD. Verbal order given for bisacodyl suppository, Will update MD following SVE.

## 2024-01-28 NOTE — PROGRESS NOTES
Dr. Han updated on pt's SVE and ortega score of 8. POC per MD is to start low dose IV oxytocin. Verbal orders given and placed. Pt agrees with plan.

## 2024-01-29 PROBLEM — Z3A.39 39 WEEKS GESTATION OF PREGNANCY: Status: RESOLVED | Noted: 2021-06-11 | Resolved: 2024-01-29

## 2024-01-29 LAB
ALT SERPL W P-5'-P-CCNC: 11 U/L (ref 0–50)
AST SERPL W P-5'-P-CCNC: 20 U/L (ref 0–45)
CREAT SERPL-MCNC: 0.8 MG/DL (ref 0.51–0.95)
EGFRCR SERPLBLD CKD-EPI 2021: >90 ML/MIN/1.73M2
HGB BLD-MCNC: 10.2 G/DL (ref 11.7–15.7)
PLATELET # BLD AUTO: 267 10E3/UL (ref 150–450)

## 2024-01-29 PROCEDURE — 84460 ALANINE AMINO (ALT) (SGPT): CPT | Performed by: FAMILY MEDICINE

## 2024-01-29 PROCEDURE — 85049 AUTOMATED PLATELET COUNT: CPT | Performed by: FAMILY MEDICINE

## 2024-01-29 PROCEDURE — 85018 HEMOGLOBIN: CPT | Performed by: FAMILY MEDICINE

## 2024-01-29 PROCEDURE — 120N000001 HC R&B MED SURG/OB

## 2024-01-29 PROCEDURE — 722N000001 HC LABOR CARE VAGINAL DELIVERY SINGLE

## 2024-01-29 PROCEDURE — 82565 ASSAY OF CREATININE: CPT | Performed by: FAMILY MEDICINE

## 2024-01-29 PROCEDURE — 250N000011 HC RX IP 250 OP 636: Mod: JZ | Performed by: FAMILY MEDICINE

## 2024-01-29 PROCEDURE — 84450 TRANSFERASE (AST) (SGOT): CPT | Performed by: FAMILY MEDICINE

## 2024-01-29 PROCEDURE — 0KQM0ZZ REPAIR PERINEUM MUSCLE, OPEN APPROACH: ICD-10-PCS | Performed by: FAMILY MEDICINE

## 2024-01-29 PROCEDURE — 250N000013 HC RX MED GY IP 250 OP 250 PS 637: Performed by: FAMILY MEDICINE

## 2024-01-29 PROCEDURE — 250N000009 HC RX 250: Performed by: FAMILY MEDICINE

## 2024-01-29 PROCEDURE — 250N000011 HC RX IP 250 OP 636: Performed by: ANESTHESIOLOGY

## 2024-01-29 PROCEDURE — 36415 COLL VENOUS BLD VENIPUNCTURE: CPT | Performed by: FAMILY MEDICINE

## 2024-01-29 RX ORDER — HYDROCORTISONE 25 MG/G
CREAM TOPICAL 3 TIMES DAILY PRN
Status: DISCONTINUED | OUTPATIENT
Start: 2024-01-29 | End: 2024-01-30 | Stop reason: HOSPADM

## 2024-01-29 RX ORDER — OXYTOCIN 10 [USP'U]/ML
10 INJECTION, SOLUTION INTRAMUSCULAR; INTRAVENOUS
Status: DISCONTINUED | OUTPATIENT
Start: 2024-01-29 | End: 2024-01-30 | Stop reason: HOSPADM

## 2024-01-29 RX ORDER — LOPERAMIDE HCL 2 MG
2 CAPSULE ORAL
Status: DISCONTINUED | OUTPATIENT
Start: 2024-01-29 | End: 2024-01-30 | Stop reason: HOSPADM

## 2024-01-29 RX ORDER — DOCUSATE SODIUM 100 MG/1
100 CAPSULE, LIQUID FILLED ORAL DAILY
Status: DISCONTINUED | OUTPATIENT
Start: 2024-01-29 | End: 2024-01-30 | Stop reason: HOSPADM

## 2024-01-29 RX ORDER — LABETALOL 100 MG/1
100 TABLET, FILM COATED ORAL EVERY 12 HOURS SCHEDULED
Status: DISCONTINUED | OUTPATIENT
Start: 2024-01-29 | End: 2024-01-30 | Stop reason: HOSPADM

## 2024-01-29 RX ORDER — MISOPROSTOL 200 UG/1
400 TABLET ORAL
Status: DISCONTINUED | OUTPATIENT
Start: 2024-01-29 | End: 2024-01-30 | Stop reason: HOSPADM

## 2024-01-29 RX ORDER — IBUPROFEN 800 MG/1
800 TABLET, FILM COATED ORAL EVERY 6 HOURS PRN
Status: DISCONTINUED | OUTPATIENT
Start: 2024-01-29 | End: 2024-01-30 | Stop reason: HOSPADM

## 2024-01-29 RX ORDER — TRANEXAMIC ACID 10 MG/ML
1 INJECTION, SOLUTION INTRAVENOUS EVERY 30 MIN PRN
Status: DISCONTINUED | OUTPATIENT
Start: 2024-01-29 | End: 2024-01-30 | Stop reason: HOSPADM

## 2024-01-29 RX ORDER — MISOPROSTOL 200 UG/1
800 TABLET ORAL
Status: DISCONTINUED | OUTPATIENT
Start: 2024-01-29 | End: 2024-01-30 | Stop reason: HOSPADM

## 2024-01-29 RX ORDER — METHYLERGONOVINE MALEATE 0.2 MG/ML
200 INJECTION INTRAVENOUS
Status: DISCONTINUED | OUTPATIENT
Start: 2024-01-29 | End: 2024-01-30 | Stop reason: HOSPADM

## 2024-01-29 RX ORDER — CARBOPROST TROMETHAMINE 250 UG/ML
250 INJECTION, SOLUTION INTRAMUSCULAR
Status: DISCONTINUED | OUTPATIENT
Start: 2024-01-29 | End: 2024-01-30 | Stop reason: HOSPADM

## 2024-01-29 RX ORDER — ACETAMINOPHEN 325 MG/1
650 TABLET ORAL EVERY 4 HOURS PRN
Status: DISCONTINUED | OUTPATIENT
Start: 2024-01-29 | End: 2024-01-30 | Stop reason: HOSPADM

## 2024-01-29 RX ORDER — OXYTOCIN/0.9 % SODIUM CHLORIDE 30/500 ML
340 PLASTIC BAG, INJECTION (ML) INTRAVENOUS CONTINUOUS PRN
Status: DISCONTINUED | OUTPATIENT
Start: 2024-01-29 | End: 2024-01-30 | Stop reason: HOSPADM

## 2024-01-29 RX ORDER — BISACODYL 10 MG
10 SUPPOSITORY, RECTAL RECTAL DAILY PRN
Status: DISCONTINUED | OUTPATIENT
Start: 2024-01-29 | End: 2024-01-30 | Stop reason: HOSPADM

## 2024-01-29 RX ORDER — MODIFIED LANOLIN
OINTMENT (GRAM) TOPICAL
Status: DISCONTINUED | OUTPATIENT
Start: 2024-01-29 | End: 2024-01-30 | Stop reason: HOSPADM

## 2024-01-29 RX ORDER — LOPERAMIDE HCL 2 MG
4 CAPSULE ORAL
Status: DISCONTINUED | OUTPATIENT
Start: 2024-01-29 | End: 2024-01-30 | Stop reason: HOSPADM

## 2024-01-29 RX ADMIN — ACETAMINOPHEN 650 MG: 325 TABLET ORAL at 10:53

## 2024-01-29 RX ADMIN — NALBUPHINE HYDROCHLORIDE 2.5 MG: 20 INJECTION, SOLUTION INTRAMUSCULAR; INTRAVENOUS; SUBCUTANEOUS at 03:26

## 2024-01-29 RX ADMIN — IBUPROFEN 800 MG: 800 TABLET ORAL at 10:53

## 2024-01-29 RX ADMIN — KETOROLAC TROMETHAMINE 30 MG: 30 INJECTION, SOLUTION INTRAMUSCULAR; INTRAVENOUS at 03:15

## 2024-01-29 RX ADMIN — DULOXETINE HYDROCHLORIDE 120 MG: 60 CAPSULE, DELAYED RELEASE PELLETS ORAL at 19:10

## 2024-01-29 RX ADMIN — DOCUSATE SODIUM 100 MG: 100 CAPSULE, LIQUID FILLED ORAL at 08:00

## 2024-01-29 RX ADMIN — ACETAMINOPHEN 650 MG: 325 TABLET ORAL at 23:45

## 2024-01-29 RX ADMIN — LEVOTHYROXINE SODIUM 25 MCG: 0.03 TABLET ORAL at 07:58

## 2024-01-29 RX ADMIN — TRAZODONE HYDROCHLORIDE 50 MG: 50 TABLET ORAL at 22:12

## 2024-01-29 RX ADMIN — PENICILLIN G 3 MILLION UNITS: 3000000 INJECTION, SOLUTION INTRAVENOUS at 00:03

## 2024-01-29 RX ADMIN — LABETALOL HYDROCHLORIDE 100 MG: 100 TABLET, FILM COATED ORAL at 20:33

## 2024-01-29 RX ADMIN — LIDOCAINE HYDROCHLORIDE 20 ML: 10 INJECTION, SOLUTION EPIDURAL; INFILTRATION; INTRACAUDAL; PERINEURAL at 02:48

## 2024-01-29 RX ADMIN — BENZOCAINE AND LEVOMENTHOL: 200; 5 SPRAY TOPICAL at 08:13

## 2024-01-29 RX ADMIN — LABETALOL HYDROCHLORIDE 100 MG: 100 TABLET, FILM COATED ORAL at 09:12

## 2024-01-29 RX ADMIN — WITCH HAZEL: 500 SOLUTION RECTAL; TOPICAL at 08:13

## 2024-01-29 RX ADMIN — ACETAMINOPHEN 650 MG: 325 TABLET ORAL at 16:51

## 2024-01-29 RX ADMIN — IBUPROFEN 800 MG: 800 TABLET ORAL at 23:45

## 2024-01-29 RX ADMIN — IBUPROFEN 800 MG: 800 TABLET ORAL at 16:51

## 2024-01-29 ASSESSMENT — ACTIVITIES OF DAILY LIVING (ADL)
ADLS_ACUITY_SCORE: 20

## 2024-01-29 NOTE — PLAN OF CARE
Data: Vital signs within normal limits. Postpartum checks within normal limits - see flow record. Patient eating and drinking normally. Patient able to empty bladder independently and is up ambulating. No apparent signs of infection. Incision healing well. Patient performing self cares and is able to care for infant.  Action: Patient medicated during the shift for  headache . See MAR. Patient reassessed within 1 hour after each medication and pain was improved - patient stated she was comfortable. Patient education done about post partum overview, bottle feeding infant. See flow record.  Response: Positive attachment behaviors observed with infant. Support persons is present.     Problem: Labor  Goal: Hemostasis  Outcome: Adequate for Care Transition  Goal: Stable Fetal Wellbeing  Outcome: Adequate for Care Transition  Goal: Effective Progression to Delivery  Outcome: Adequate for Care Transition  Goal: Absence of Infection Signs and Symptoms  Outcome: Adequate for Care Transition  Intervention: Prevent or Manage Infection  Recent Flowsheet Documentation  Taken 1/29/2024 0430 by Sayda Prakash, RN  Infection Prevention:   environmental surveillance performed   equipment surfaces disinfected   hand hygiene promoted   personal protective equipment utilized   rest/sleep promoted   single patient room provided   visitors restricted/screened  Goal: Acceptable Pain Control  Outcome: Adequate for Care Transition  Goal: Normal Uterine Contraction Pattern  Outcome: Adequate for Care Transition   Goal Outcome Evaluation:

## 2024-01-29 NOTE — ANESTHESIA POSTPROCEDURE EVALUATION
Patient: Mary Carlin    Procedure: * No procedures listed *       Anesthesia Type:  Epidural    Note:  Disposition: Admission   Postop Pain Control: Uneventful            Sign Out: Well controlled pain   PONV: No   Neuro/Psych: Uneventful            Sign Out: Acceptable/Baseline neuro status   Airway/Respiratory: Uneventful            Sign Out: Acceptable/Baseline resp. status   CV/Hemodynamics: Uneventful            Sign Out: Acceptable CV status; No obvious hypovolemia; No obvious fluid overload   Other NRE: NONE   DID A NON-ROUTINE EVENT OCCUR? No           Last vitals:  Vitals:    01/29/24 0428 01/29/24 0430 01/29/24 0818   BP: (!) 159/98 (!) 153/93 (!) 149/91   Pulse:   90   Resp:  18 18   Temp:  36.7  C (98.1  F) 36.6  C (97.9  F)   SpO2:   97%       Electronically Signed By: Dahlia Vang MD  January 29, 2024  9:07 AM

## 2024-01-29 NOTE — L&D DELIVERY NOTE
OB Vaginal Delivery Note    Mary Carlin MRN# [id number]   Age: 38 year old YOB: 1985       GA: 37w3d  GP:   Labor Complications: None   EBL:   mL  Delivery QBL: 400 mL  Delivery Type: Vaginal, Spontaneous   ROM to Delivery Time: (Delivered) Hours: 8 Minutes: 2  [place] Weight:     1 Minute 5 Minute 10 Minute   Apgar Totals: 8   9        NEMO HAJI;CHRISTINA DESIR     Delivery Details:  Mary Carlin, a 38 year old  female induced for gestational HTN, who delivered a viable M infant with apgars of 8  and 9 .  Delivery was via vaginal, spontaneous  to a sterile field under epidural;local  anesthesia. Infant delivered in vertex  right  occiput  anterior  position. Anterior and posterior shoulders delivered without difficulty. The cord was clamped, cut twice and 3 vessels  were noted. .      Cord complications: nuchal   Placenta delivered at 2024  2:42 AM . Placental disposition was Hospital disposal . Fundal massage performed and fundus found to be firm.     Episiotomy: none    Perineum, vagina, cervix were inspected, and the following lacerations were noted:   Perineal lacerations: 2nd     Any lacerations were repaired in the usual fashion using 3.0 vicryl.    Excellent hemostasis was noted. Needle count correct. Infant and patient in delivery room in good and stable condition.        Eusebio Carlin [[id number]]      Labor Event Times      Dilation complete date: 24 Complete time:  2:33 AM          Labor Events     labor?: No  Labor Type: Induction/Cervical ripening  Predominate monitoring during 1st stage: continuous electronic fetal monitoring     Antibiotics received during labor?: Yes  Reason for Antibiotics: GBS  Antibiotics Given (GBS): Greater than 4 hours prior to delivery       Rupture date/time: 24 1833   Rupture type: Artificial Rupture of Membranes  Fluid color: Clear  Fluid odor: Normal     Induction: Misoprostol, Cervidil, Oxytocin,  AROM  Induction date/time:      Cervical ripening date/time: 24    Indications for induction: Gestational Hypertension       Delivery/Placenta Date and Time      Delivery Date: 24 Delivery Time:  2:35 AM   Placenta Date/Time: 2024  2:42 AM  Oxytocin given at the time of delivery: after delivery of baby  Delivering clinician: Rebecca Han MD   Other personnel present at delivery:  Provider Role   Lma Jim RN Registered Nurse   Sayda Prakash RN              Vaginal Counts       Initial count performed by 2 team members:  Two Team Members   MD Diandra Han RN         Needles Suture Needles Sponges (RETIRED) Instruments   Initial counts   5    Added to count 1 1     Relief counts       Final counts               Placed during labor Accounted for at the end of labor   FSE No NA   IUPC No NA   Cervidil No NA                  Final count performed by 2 team members:  Two Team Members   MD Diandra Han RN      Final count correct?: Yes  Pre-Birth Team Brief: Complete  Post-Birth Team Debrief: Complete       Apgars    Living status: Living   1 Minute 5 Minute 10 Minute 15 Minute 20 Minute   Skin color: 0  1       Heart rate: 2  2       Reflex irritability: 2  2       Muscle tone: 2  2       Respiratory effort: 2  2       Total: 8  9       Apgars assigned by: QUIN YU       Cord      Vessels: 3 Vessels    Cord Complications: Nuchal   Nuchal Intervention: reduced         Nuchal cord description: loose nuchal cord         Cord Blood Disposition: Discard    Gases Sent?: No    Delayed cord clamping?: Yes    Cord Clamping Delay (seconds): 31-60 seconds    Stem cell collection?: No            Resuscitation    Methods: None       Labor Events and Shoulder Dystocia    Fetal Tracing Prior to Delivery: Category 2  Shoulder dystocia present?: Neg       Delivery (Maternal) (Provider to Complete) ([id number])    Episiotomy: None  Perineal lacerations: 2nd Repaired?: Yes   Repair suture: 3-0  [place]  Number of repair packets: 1  Genital tract inspection done: Pos       Blood Loss  Mother: Mary Carlin #[id number]     Start of Mother's Information      Delivery Blood Loss  01/28/24 1435 - 01/29/24 0310      Delivery QBL (mL) Hospital Encounter 400 mL    Total  400 mL               End of Mother's Information  Mother: Mary Carlin #[id number]                Delivery - Provider to Complete ([id number])    Delivering clinician: Campos Han MD  Delivery Type (Choose the 1 that will go to the Birth History): Vaginal, Spontaneous                         Other personnel:  Provider Role   Lam Jim, RN Registered Nurse   Sayda Prakash RN                     Placenta    Date/Time: 1/29/2024  2:42 AM  Removal: Spontaneous  Disposition: Hospital disposal             Anesthesia    Method: Epidural, Local                    Presentation and Position    Presentation: Vertex    Position: Right Occiput Anterior                     CAMPOS HAN MD

## 2024-01-29 NOTE — ANESTHESIA PROCEDURE NOTES
"Epidural catheter Procedure Note    Pre-Procedure   Staff -        Anesthesiologist:  Uche Martell MD       Performed By: anesthesiologist       Location: OB       Procedure Start/Stop Times: 1/28/2024 11:35 AM and 1/28/2024 11:52 AM       Pre-Anesthestic Checklist: patient identified, IV checked, risks and benefits discussed, informed consent, monitors and equipment checked, pre-op evaluation, at physician/surgeon's request and post-op pain management  Timeout:       Correct Patient: Yes        Correct Procedure: Yes        Correct Site: Yes        Correct Position: Yes   Procedure Documentation  Procedure: epidural catheter       Diagnosis: labor pain       Patient Position: sitting       Skin prep: Chloraprep       Local skin infiltrated with mL of 1% lidocaine.        Insertion Site: L3-4. (midline approach).       Technique: LORT air        Needle type: [employer].       Needle Gauge: 18.        Needle Length (Inches): 3.5        Catheter: 20 G.          Catheter threaded easily.             # of attempts: 1 and  # of redirects:  0    Assessment/Narrative         Paresthesias: No.       Test dose of 3 mL lidocaine 1.5% w/ 1:200,000 epinephrine at 11:45 CST.        .       Insertion/Infusion Method: LORT air       Aspiration negative for Heme or CSF via Epidural Catheter.    Medication(s) Administered   0.25% Bupivacaine PF (Epidural) - EPIDURAL   10 mL - 1/28/2024 11:47:00 AM  Medication Administration Time: 1/28/2024 11:35 AM      FOR [facility] ([facility] [place]) ONLY:   Pain Team Contact information: please page the Pain Team Via [employer]. Search \"Pain\". During daytime hours, please page the attending first. At night please page the resident first.      "

## 2024-01-29 NOTE — PROGRESS NOTES
Late entry    Provider notification:   Provider: Dr Guille BENDER was notified at 1230 regarding: a persistent Category II fetal heart rate tracing for 30 minutes.  Category II Algorithm     Fetal heart rate and uterine activity reviewed with provider.    EFM interpretation suggests: absence of concern for metabolic acidemia due to: presence of accelerations and moderate variability. EFM suggests no concern for interruption of the oxygen pathway...     Interventions to improve fetal oxygenation for a Category II tracing include: Category II algorithm reviewed, continue monitoring, maternal positioning, and sterile vaginal exam    After discussion with provider:Provider coming to bedside    Plan per provider / orders received for continue to monitor.  .

## 2024-01-29 NOTE — PROGRESS NOTES
Called to assess due to variables. Cvx 5cm/80%/-2/soft/mid. Catheter placed in bladder. Pt comfortable with epidural.    FHT category 2 with moderate variability and variable decels.Continue augmentation.

## 2024-01-29 NOTE — PROGRESS NOTES
Interval history: Pt rested well overnight with morphine. After last dose of cytotec today cvx had ortega score of 8. At that point pitocin induction was started. Pt tolerating well. No further HA or vision change.    O: Afeb VS reviewed BP briefly elevated to 161/101 at 4 AM. No other severe range BP.    Cvx: 3cm/80%/-2/post/med    AROM performed by me after assured head was engaged and informed consent from pt.    Clear fluid    FHT category 2 with baseline 150, moderate variability and accelerations. Early decels.    Contractions q 2-4 min.    A/P: 37 yo F  at 37 2/7 weeks gestation admitted for induction due to gestational HTN. Labs have been normal. Pts pre admission symptoms have resolved. Now s/p AROM and contraction intensity increasing. Will decrease pitocin as able. Pt desires epidural. I was able to confirm her GBS through [facility] on 24 was positive. Start abx now.

## 2024-01-29 NOTE — ANESTHESIA PREPROCEDURE EVALUATION
Anesthesia Pre-Procedure Evaluation    Patient: Mary Carlin   MRN: [id number] : 1985        Procedure :           Past Medical History:   Diagnosis Date    ADHD (attention deficit hyperactivity disorder)     Endometriosis     Female infertility     frequent losses, pt on blood thinners.    Hx of tonsillectomy     Irregular menstrual cycle     Mental disorder     hx of anxiety prior to pregnancy. Off zoloft prior to pregnancy    Migraines     Other and unspecified ovarian cyst     Other malaise and fatigue     PCOS (polycystic ovarian syndrome)     PONV (postoperative nausea and vomiting)     with tonsillectomy    Postpartum depression     hospitalized for 5 days    Pregnant state, incidental       Past Surgical History:   Procedure Laterality Date    CERVICAL BIOPSY N/A 2022    Procedure: ENDOCERVICAL AND ENDOMETRIAL CULTURES AND BIOPSIES,;  Surgeon: Pippa Alatorre MD;  Location: [facility]    COLONOSCOPY      DILATION AND CURETTAGE N/A 2019    Procedure: SUCTION CURETTAGE, UTERINE CULTURE;  Surgeon: Lemuel Nj MD;  Location: [facility];  Service: Obstetrics    HYSTEROSALPINGOGRAM Bilateral 2022    Procedure: BILATERAL SELECTIVE HYSTEROSALPINGOGRAM, LEFT FALLOPIAN TUBE RECANALIZATION (HYSTEROSCOPIC GUIDED),;  Surgeon: Pippa Alatorre MD;  Location: [facility] OR    HYSTEROSCOPY W/ POLYPECTOMY N/A 2022    Procedure: DIAGNOSTIC HYSTEROSCOPY;  Surgeon: Pippa Alatorre MD;  Location: [facility]    LASER ABLATION, ENDOMETRIOSIS, USING CO2 LASER, ROBOT ASSISTED, LAPAROSCOPIC, USING DA LEONIDAS XI N/A 2022    Procedure: ROBOTIC GUIDED CARBON DIOXIDE LASER, BILATERAL OVARIAN WEDGE RESECTION, EXCISION AND VAPORIZATION OF ENDOMETRIOSIS, LYSIS OF ADHESIONS, ADHESION PREVENTION MEASURES;  Surgeon: Pippa Alatorre MD;  Location: [facility] OR    MA HYSTEROSCOPY,W/ENDO BX N/A 2019    Procedure: HYSTEROSCOPY, DILATATION AND  "CURETTAGE, UTERINE CULTURE;  Surgeon: Lemuel Nj MD;  Location: [facility];  Service: Gynecology    IA LAP,DIAGNOSTIC ABDOMEN N/A 2/27/2019    Procedure: LAPAROSCOPY WITH TUBAL DYE STUDY. FULGURATION OF ENDOMETRIOSIS;  Surgeon: Lemuel Nj MD;  Location: [facility];  Service: Gynecology    TONSILLECTOMY Bilateral     age 6      Allergies   Allergen Reactions    Chlorhexidine Towelette [Chlorhexidine] Hives    Reglan [Metoclopramide] Unknown     Emotionally \"crawling out of my skin\"    Wheat Bran Other (See Comments) and Itching       Gi upset      Social History     Tobacco Use    Smoking status: Never    Smokeless tobacco: Never   Substance Use Topics    Alcohol use: Not Currently      Wt Readings from Last 1 Encounters:   01/27/24 108.7 kg (239 lb 9.6 oz)        Anesthesia Evaluation            ROS/MED HX  ENT/Pulmonary:  - neg pulmonary ROS     Neurologic:       Cardiovascular:     (+)  - - PIH  -  - -                                      METS/Exercise Tolerance:     Hematologic:  - neg hematologic  ROS     Musculoskeletal:       GI/Hepatic:     (+) GERD,                   Renal/Genitourinary:       Endo:     (+)               Obesity,       Psychiatric/Substance Use:       Infectious Disease:       Malignancy:       Other:            Physical Exam    Airway        Mallampati: II   TM distance: > 3 FB   Neck ROM: full   Mouth opening: > 3 cm    Respiratory Devices and Support         Dental  no notable dental history         Cardiovascular   cardiovascular exam normal          Pulmonary   pulmonary exam normal                OUTSIDE LABS:  CBC:   Lab Results   Component Value Date    WBC 10.2 01/26/2024    WBC 9.5 06/11/2021    HGB 10.6 (L) 01/28/2024    HGB 11.2 (L) 01/26/2024    HCT 35.6 01/26/2024    HCT 32.7 (L) 06/11/2021     01/28/2024     01/26/2024     BMP:   Lab Results   Component Value Date     01/26/2024     04/03/2019    " POTASSIUM 3.8 01/26/2024    POTASSIUM 4.1 04/03/2019    CHLORIDE 102 01/26/2024    CHLORIDE 107 04/03/2019    CO2 21 (L) 01/26/2024    CO2 22 04/03/2019    BUN 12.6 01/26/2024    BUN 12 04/03/2019    CR 0.85 01/28/2024    CR 0.75 01/26/2024    GLC 90 01/26/2024     04/03/2019     COAGS:   Lab Results   Component Value Date    PTT 26 06/11/2021    INR 0.90 06/11/2021     POC:   Lab Results   Component Value Date    HCG Negative 02/27/2019     HEPATIC:   Lab Results   Component Value Date    ALBUMIN 4.5 04/03/2019    PROTTOTAL 7.4 04/03/2019    ALT 10 01/28/2024    AST 19 01/28/2024    ALKPHOS 66 04/03/2019    BILITOTAL 0.7 04/03/2019     OTHER:   Lab Results   Component Value Date    SHAUN 9.1 01/26/2024    LIPASE 13 04/03/2019       Anesthesia Plan    ASA Status:  3       Anesthesia Type: Epidural.              Consents    Anesthesia Plan(s) and associated risks, benefits, and realistic alternatives discussed. Questions answered and patient/representative(s) expressed understanding.     - Discussed:     - Discussed with:  Patient            Postoperative Care            Comments:           neg OB ROS.      Uche Martell MD    I have reviewed the pertinent notes and labs in the chart from the past 30 days and (re)examined the patient.  Any updates or changes from those notes are reflected in this note.

## 2024-01-30 VITALS
WEIGHT: 233.2 LBS | HEART RATE: 91 BPM | TEMPERATURE: 97.8 F | DIASTOLIC BLOOD PRESSURE: 85 MMHG | OXYGEN SATURATION: 98 % | SYSTOLIC BLOOD PRESSURE: 135 MMHG | RESPIRATION RATE: 16 BRPM | HEIGHT: 68 IN | BODY MASS INDEX: 35.34 KG/M2

## 2024-01-30 PROBLEM — Z86.59 HISTORY OF POSTPARTUM DEPRESSION: Chronic | Status: ACTIVE | Noted: 2024-01-30

## 2024-01-30 PROBLEM — Z87.59 HISTORY OF POSTPARTUM DEPRESSION: Chronic | Status: ACTIVE | Noted: 2024-01-30

## 2024-01-30 PROBLEM — O09.529 AMA (ADVANCED MATERNAL AGE) MULTIGRAVIDA 35+: Status: RESOLVED | Noted: 2024-01-26 | Resolved: 2024-01-30

## 2024-01-30 PROBLEM — Z34.90 PREGNANT: Status: RESOLVED | Noted: 2021-06-11 | Resolved: 2024-01-30

## 2024-01-30 LAB
ALT SERPL W P-5'-P-CCNC: 11 U/L (ref 0–50)
AST SERPL W P-5'-P-CCNC: 18 U/L (ref 0–45)
CREAT SERPL-MCNC: 0.9 MG/DL (ref 0.51–0.95)
EGFRCR SERPLBLD CKD-EPI 2021: 84 ML/MIN/1.73M2
HGB BLD-MCNC: 8.8 G/DL (ref 11.7–15.7)
PLATELET # BLD AUTO: 208 10E3/UL (ref 150–450)

## 2024-01-30 PROCEDURE — 84450 TRANSFERASE (AST) (SGOT): CPT | Performed by: FAMILY MEDICINE

## 2024-01-30 PROCEDURE — 85018 HEMOGLOBIN: CPT | Performed by: FAMILY MEDICINE

## 2024-01-30 PROCEDURE — 82565 ASSAY OF CREATININE: CPT | Performed by: FAMILY MEDICINE

## 2024-01-30 PROCEDURE — 85049 AUTOMATED PLATELET COUNT: CPT | Performed by: FAMILY MEDICINE

## 2024-01-30 PROCEDURE — 84460 ALANINE AMINO (ALT) (SGPT): CPT | Performed by: FAMILY MEDICINE

## 2024-01-30 PROCEDURE — 36415 COLL VENOUS BLD VENIPUNCTURE: CPT | Performed by: FAMILY MEDICINE

## 2024-01-30 PROCEDURE — 250N000013 HC RX MED GY IP 250 OP 250 PS 637: Performed by: FAMILY MEDICINE

## 2024-01-30 RX ORDER — IBUPROFEN 200 MG
800 TABLET ORAL EVERY 6 HOURS PRN
COMMUNITY
Start: 2024-01-30

## 2024-01-30 RX ORDER — ACETAMINOPHEN 325 MG/1
650 TABLET ORAL EVERY 4 HOURS PRN
COMMUNITY
Start: 2024-01-30

## 2024-01-30 RX ORDER — LABETALOL 100 MG/1
100 TABLET, FILM COATED ORAL EVERY 12 HOURS
Qty: 60 TABLET | Refills: 3 | Status: SHIPPED | OUTPATIENT
Start: 2024-01-30

## 2024-01-30 RX ORDER — PROGESTERONE 50 MG/ML
200 INJECTION, SOLUTION INTRAMUSCULAR EVERY OTHER DAY
Status: SHIPPED | OUTPATIENT
Start: 2024-01-30 | End: 2024-02-09

## 2024-01-30 RX ADMIN — LEVOTHYROXINE SODIUM 25 MCG: 0.03 TABLET ORAL at 06:54

## 2024-01-30 RX ADMIN — BISACODYL 10 MG: 10 SUPPOSITORY RECTAL at 12:56

## 2024-01-30 RX ADMIN — IBUPROFEN 800 MG: 800 TABLET ORAL at 13:25

## 2024-01-30 RX ADMIN — ACETAMINOPHEN 650 MG: 325 TABLET ORAL at 06:54

## 2024-01-30 RX ADMIN — ACETAMINOPHEN 650 MG: 325 TABLET ORAL at 11:19

## 2024-01-30 RX ADMIN — DOCUSATE SODIUM 100 MG: 100 CAPSULE, LIQUID FILLED ORAL at 08:37

## 2024-01-30 RX ADMIN — IBUPROFEN 800 MG: 800 TABLET ORAL at 06:54

## 2024-01-30 RX ADMIN — LABETALOL HYDROCHLORIDE 100 MG: 100 TABLET, FILM COATED ORAL at 08:37

## 2024-01-30 ASSESSMENT — ACTIVITIES OF DAILY LIVING (ADL)
ADLS_ACUITY_SCORE: 20

## 2024-01-30 NOTE — DISCHARGE INSTRUCTIONS
Warning Signs after Having a Baby    Keep this paper on your fridge or somewhere else where you can see it.    Call your provider if you have any of these symptoms up to 12 weeks after having your baby.    Thoughts of hurting yourself or your baby  Pain in your chest or trouble breathing  Severe headache not helped by pain medicine  Eyesight concerns (blurry vision, seeing spots or flashes of light, other changes to eyesight)  Fainting, shaking or other signs of a seizure    Call 9-1-1 if you feel that it is an emergency.     The symptoms below can happen to anyone after giving birth. They can be very serious. Call your provider if you have any of these warning signs.    My provider s phone number: _______________________    Losing too much blood (hemorrhage)    Call your provider if you soak through a pad in less than an hour or pass blood clots bigger than a golf ball. These may be signs that you are bleeding too much.    Blood clots in the legs or lungs    After you give birth, your body naturally clots its blood to help prevent blood loss. Sometimes this increased clotting can happen in other areas of the body, like the legs or lungs. This can block your blood flow and be very dangerous.     Call your provider if you:  Have a red, swollen spot on the back of your leg that is warm or painful when you touch it.   Are coughing up blood.     Infection    Call your provider if you have any of these symptoms:  Fever of 100.4 F (38 C) or higher.  Pain or redness around your stitches if you had an incision.   Any yellow, white, or green fluid coming from places where you had stitches or surgery.    Mood Problems (postpartum depression)    Many people feel sad or have mood changes after having a baby. But for some people, these mood swings are worse.     Call your provider right away if you feel so anxious or nervous that you can't care for yourself or your baby.    Preeclampsia (high blood pressure)    Even if you  "didn't have high blood pressure when you were pregnant, you are at risk for the high blood pressure disease called preeclampsia. This risk can last up to 12 weeks after giving birth.     Call your provider if you have:   Pain on your right side under your rib cage  Sudden swelling in the hands and face    Remember: You know your body. If something doesn't feel right, get medical help.     For informational purposes only. Not to replace the advice of your health care provider. Copyright 2020 [employer] [facility] [place]. All rights reserved. Clinically reviewed by Maria R Velasquez, RNC-OB, MSN. [employer] [id number] - Rev .    Postpartum: Care Instructions  Overview  After childbirth (postpartum period), your body goes through many changes. Some of these changes happen over several weeks. In the hours after delivery, your body will begin to recover from childbirth while it prepares to breastfeed your . You may feel emotional during this time. Your hormones can shift your mood without warning for no clear reason.  In the first couple of weeks after childbirth, it's common to have emotions that change from happy to sad. You may find it hard to sleep. You may cry a lot. This is called the \"baby blues.\" These overwhelming emotions often go away within a couple of days or weeks. But it's important to discuss your feelings with your doctor.  It's easy to get too tired and overwhelmed during the first weeks after childbirth. Don't try to do too much. Get rest whenever you can, accept help from others, and eat well and drink plenty of fluids.  In the first couple of weeks after you give birth, your doctor or midwife may want to check in with you and make a plan for any follow-up care you may need. You will likely have a complete postpartum visit in the first 3 months after delivery. At that time, your doctor or midwife will check on your recovery from childbirth and see how you're doing with your emotions. You may also " discuss your concerns or questions.  Follow-up care is a key part of your treatment and safety. Be sure to make and go to all appointments, and call your doctor if you are having problems. It's also a good idea to know your test results and keep a list of the medicines you take.  How can you care for yourself at home?  Sleep or rest when your baby sleeps.  Get help with household chores from family or friends, if you can. Don't try to do it all yourself.  If you have hemorrhoids or swelling or pain around the opening of your vagina, try using cold and heat. You can put ice or a cold pack on the area for 10 to 20 minutes at a time. Put a thin cloth between the ice and your skin. Also try sitting in a few inches of warm water (sitz bath) 3 times a day and after bowel movements.  Take pain medicines exactly as directed.  If the doctor gave you a prescription medicine for pain, take it as prescribed.  If you are not taking a prescription pain medicine, ask your doctor if you can take an over-the-counter medicine.  Eat more fiber to avoid constipation. Include foods such as whole-grain breads and cereals, raw vegetables, raw and dried fruits, and beans.  Drink plenty of fluids. If you have kidney, heart, or liver disease and have to limit fluids, talk with your doctor before you increase the amount of fluids you drink.  Do not rinse inside your vagina with fluids (douche).  If you have stitches, keep the area clean by pouring or spraying warm water over the area outside your vagina and anus after you use the toilet.  Keep a list of questions to ask your doctor or midwife. Your questions might be about:  Changes in your breasts, such as lumps or soreness.  When to expect your menstrual period to start again.  What form of birth control is best for you.  Weight you have put on during the pregnancy.  Exercise options.  What foods and drinks are best for you, especially if you are breastfeeding.  Problems you might be having  with breastfeeding.  When you can have sex. You may want to talk about lubricants for your vagina.  Any feelings of sadness or restlessness that you are having.  When should you call for help?  Share this information with your partner, family, or a friend. They can help you watch for warning signs.  Call 911  anytime you think you may need emergency care. For example, call if:    You have thoughts of harming yourself, your baby, or another person.     You passed out (lost consciousness).     You have chest pain, are short of breath, or cough up blood.     You have a seizure.   Where to get help 24 hours a day, 7 days a week   If you or someone you know talks about suicide, self-harm, a mental health crisis, a substance use crisis, or any other kind of emotional distress, get help right away. You can:    Call the Suicide and Crisis Lifeline at 988.     Call 1-412-618-TALK (1-491.351.1109).     Text HOME to 539642 to access the Crisis Text Line.   Consider saving these numbers in your phone.  Go to [employer] for more information or to chat online.  Call your doctor now or seek immediate medical care if:    You have signs of hemorrhage (too much bleeding), such as:  Heavy vaginal bleeding. This means that you are soaking through one or more pads in an hour. Or you pass blood clots bigger than an egg.  Feeling dizzy or lightheaded, or you feel like you may faint.  Feeling so tired or weak that you cannot do your usual activities.  A fast or irregular heartbeat.  New or worse belly pain.     You have signs of infection, such as:  A fever.  Frequent or painful urination or blood in your urine.  Vaginal discharge that smells bad.  New or worse belly pain.     You have symptoms of a blood clot in your leg (called a deep vein thrombosis), such as:  Pain in the calf, back of the knee, thigh, or groin.  Swelling in the leg or groin.  A color change on the leg or groin. The skin may be reddish or purplish, depending on  "your usual skin color.     You have signs of preeclampsia, such as:  Sudden swelling of your face, hands, or feet.  New vision problems (such as dimness, blurring, or seeing spots).  A severe headache.     You have signs of heart failure, such as:  New or increased shortness of breath.  New or worse swelling in your legs, ankles, or feet.  Sudden weight gain, such as more than 2 to 3 pounds in a day or 5 pounds in a week.  Feeling so tired or weak that you cannot do your usual activities.     You had spinal or epidural pain relief and have:  New or worse back pain.  Increased pain, swelling, warmth, or redness at the injection site.  Tingling, weakness, or numbness in your legs or groin.   Watch closely for changes in your health, and be sure to contact your doctor if:    Your vaginal bleeding isn't decreasing.     You feel sad, anxious, or hopeless for more than a few days.     You are having problems with your breasts or breastfeeding.   Where can you learn more?  Go to https://www.[employer].net/patiented  Enter Z768 in the search box to learn more about \"Postpartum: Care Instructions.\"  Current as of: July 11, 2023               Content Version: 13.8    [id number] [employer].   Care instructions adapted under license by your healthcare professional. If you have questions about a medical condition or this instruction, always ask your healthcare professional. [employer] disclaims any warranty or liability for your use of this information.    Checking Your Blood Pressure at Home  During and after pregnancy  How do I measure my blood pressure?  It s important to take the readings at the same time each day, such as morning and evening. Take your blood pressure before taking any morning medications.  How to get the most accurate reading  30 minutes before checking your blood pressure, avoid the following:  Drinking caffeine  Drinking alcohol  Eating  Smoking  Exercising  5 minutes before " checking your blood pressure:  Use the bathroom and urinate so you have an empty bladder.  Sit still in a chair for around 5 minutes. Stay calm and relaxed and do not talk if possible.  To check your blood pressure:     Sit up straight in a chair.  Place your feet on the floor. Don t cross your ankles or legs.  Rest your arm at the level of your heart on a table or desk or on the arm of a chair. Use the same arm every day.  Pull up your shirt sleeve. Don t take the measurement over clothes.  Wrap the blood pressure cuff around the upper part of your left arm, 1 inch (2.5 cm) above your elbow.  Fit the cuff snugly around your arm. You should be able to place only one finger between the cuff and your arm.  Position the cord so that it rests in the bend of your elbow.  Press the power button.  Sit quietly while the cuff inflates and deflates.  Read the digital reading on the monitor screen and write the numbers down (record them) in a notebook.  Wait 2-3 minutes, then repeat the steps, starting at step 1.  Which features do you need?  Arm cuff monitors give the most exact readings.  Wrist and finger blood pressure monitors are often less exact.  Pick a blood pressure monitor that has passed tests to show they measure exactly. Blood pressure cuffs for sale in the [place] that have passed tests are listed on the website www.validatebp.org.  Some monitors that have passed tests are:  Omron 3 Series Upper Arm Blood Pressure Monitor (Model [id number])  Omron 5 Series Upper Arm Blood Pressure Monitor (Model [id number])  Omron 7 Series Upper Arm Blood Pressure Monitor (Model HEM-7320)  [employer] Upper Arm Blood Pressure Monitor with Talking Function (UA 1030T)  Don t use smartphone apps. There are many smartphone apps that claim to check your blood pressure using the pulse in your wrist or finger. These don t work. They haven t passed any tests. Don t give your clinic a blood pressure reading from a smartphone ghulam.  If you have a  "flexible spending account (FSA) or health savings account (HSA), you may wish to pay yourself back (reimburse) for the machine and cuff. A blood pressure monitor is an allowed over-the- counter (OTC) item to pay yourself back from these accounts.  Cuff size  The size of the arm cuff is a key feature. Make sure the cuff is the right size for your arm. If the cuff isn t the right size, readings will either be too high or low.  To know what size cuff to buy, measure the distance around your bicep (upper arm).  Use a flexible measuring tape or . Place the measuring tape assisted between your armpit and elbow. Measure the distance around your arm in inches.  You may need to buy a cuff apart from the machine to get the right size.  Cuff sizes and arm measurements  Small adult: 22 to 26 cm (8.7 to 10.2 inches)  Adult: 27 to 34 cm (10.6 to 13.4 inches)  Large adult: 35 to 44 cm (13.8 to 17.3 inches)  Adult thigh: 45 to 52 cm (17.7 to 20.5 inches)    Copyright statement\" content=\"For informational purposes only. Not to replace the advice of your health care provider. Photo: ID [id number]   [employer]. Text copyright   2023 [facility]. All rights reserved. Clinically reviewed by [facility]. [employer] [id number] - REV 03/23.      Know Your Blood Pressure Numbers  For patients who've had a high blood pressure disorder of pregnancy  What to know about high blood pressure disorders of pregnancy  People who had high blood pressure during pregnancy may continue to have high blood pressure for up to 12 weeks after pregnancy. It can also raise your lifetime risk of chronic high blood pressure, heart disease and blood vessel disease. It is vital that you keep monitoring your blood pressure and taking steps to control it.   The general guidelines below are what your blood pressures mean.  A heart healthy lifestyle that includes blood pressure control can help reduce these " "risks. A good blood pressure goal is less than 130/80 mmHg long-term.  Talk to your provider about high blood pressure disorder of pregnancy and what this means for your lifelong health.   Know your numbers  Read \"Checking Your Blood Pressure at Home\" to learn the best way to take your blood pressure.  Refer to the next page for general guidelines about what your blood pressures mean and what to do about them. Follow these instructions unless your provider tells you something different.  For more resources, visit www.preeclampsia.org.  What to do if your blood pressure is high  Act right away if you have numbers in the yellow or red range--don't wait for a scheduled appointment.  When to call your provider  Regardless of your blood pressure, call your healthcare provider right away if you develop any of these symptoms:  Severe headache  Chest pain  Trouble breathing  Stomach pain  Changes in vision  Swelling in your hands and face.  Please say, \"I am having symptoms of high blood pressure. My provider told me to call and ask to be seen right away when I have these symptoms.\"  If you ARE pregnant OR   it has been less than 12 weeks since you delivered  Systolic pressure (top number) is....  Diastolic (bottom number) is.... Your blood pressure is....   160 or higher  or 110 or higher VERY HIGH. Check it again in 10 minutes, then contact your provider.   140 - 159  or 90 - 109 HIGH. Keep checking blood pressure 2 times a day. If your blood pressure is in this range for 2 readings, contact your provider within 24 hours. We will discuss starting or increasing your blood pressure medicine.   100 - 139  and 60 - 89 NORMAL. Your blood pressure looks great!   Keep checking it 2 times a day.   Less than 100  or Less than 60 LOW. Check your blood pressure again in   10 minutes, then contact your provider. We may need to make changes to your blood pressure medicine.     Call your provider right away if you have these symptoms: " a severe headache, vision changes, shortness of breath, chest pain, or right upper belly pain. Call even if your blood pressure is okay.  Call 9-1-1 if you feel the symptoms are severe and that it is an emergency.   If you are NOT pregnant OR   it has been more than 12 weeks since you delivered  Systolic pressure (top number) is....  Diastolic (bottom number) is.... Your blood pressure is....   Higher than 180 and/or Higher than 120 Hypertensive crisis. Call your doctor right away.   140 or higher or  90 or higher Hypertension Stage 2. Follow up with your provider.   130-139 or Less than 80 Hypertension Stage 1. Follow up with your provider.   120-129 and Less than 80 Elevated blood pressure (pre-hypertension). You are at higher risk of developing high blood pressure. Follow up with your provider.   Less than 120 and Less than 80 Normal.     Call your provider right away if you have these symptoms: a severe headache, vision changes, shortness of breath, chest pain, or right upper belly pain. Call even if your blood pressure is okay.  Call 9-1-1 if you feel the symptoms are severe and that it is an emergency.   For informational purposes only. Not to replace the advice of your health care provider. Copyright   2023 [facility]. All rights reserved. Clinically reviewed by [facility]. [employer] [id number] - 03/23.

## 2024-01-30 NOTE — PLAN OF CARE
"Goal Outcome Evaluation: Progressing      Plan of Care Reviewed With: patient    Overall Patient Progress: improvingOverall Patient Progress: improving    Patient is independent in room, caring for herself and infant. Spouse is at the bedside and attentive to patient and infant as well.    Patient is voiding spontaneously without difficulty. She reported having a small hard stool and states that she feels constipated. Patient was encouraged to increase fluid intake and ambulate to promote bowel motility. She also drank a glass of prune and apple juice.     Blood pressures have been stable. Patient denies symptoms of preeclampsia. Her reflexes are WDL. Labetalol was given per MAR.     Pain being managed with PRN Ibuprofen, Tylenol, and Dermoplast per MAR.    Fundus is firm, bleeding scant. VSS.    /89 (BP Location: Right arm, Patient Position: Semi-Kenny's, Cuff Size: Adult Regular)   Pulse 85   Temp 97.8  F (36.6  C) (Oral)   Resp 16   Ht 1.727 m (5' 8\")   Wt 106.3 kg (234 lb 4.8 oz)   SpO2 95%   Breastfeeding Unknown   BMI 35.63 kg/m      Evi Arellano RN on 1/29/2024 at 9:20 PM    "

## 2024-01-30 NOTE — DISCHARGE SUMMARY
[facility]    Discharge Summary  Obstetrics    Date of Admission:  2024  Date of Discharge:  2024  Discharging Provider: Jessica Strickland MD    Discharge Diagnoses   Principal Problem:    Gestational hypertension  Active Problems:     (normal spontaneous vaginal delivery)    History of postpartum depression    Postpartum anemia        History of Present Illness   Mary Carlin is a 38 year old female who presented with plan for labor induction.    Hospital Course     The patient's hospital course was remarkable only for hemoglobin of 8.8 postpartum with a QBL of 400 ml.  She recovered as anticipated and experienced no other post-delivery complications.  On discharge, her pain was well controlled.Blood pressure was well controlled on medication. Vaginal bleeding is similar to peak menstrual flow.  Voiding without difficulty.  Ambulating well and tolerating a normal diet.  No fevers.  Breastfeeding going well.  Infant is stable.  She was discharged on post-partum day #1.   Post-partum hemoglobin:   Hemoglobin   Date Value Ref Range Status   2024 8.8 (L) 11.7 - 15.7 g/dL Final       [place] Depression Scale  Thoughts of Harming Self: Never  Total Score: 1      Jessica Strickland MD    Discharge Disposition   Discharged to home   Condition at discharge: Stable    Primary Care Physician   Sheree English    Consultations This Hospital Stay   None    Discharge Orders   Her hemoglobin should be rechecked in 1 week.  She should continue prenatal vitamin with iron and an additional oral iron supplement.       Activity    Activity as tolerated     Reason for your hospital stay    Maternity care     Follow Up     Follow up with provider in 1 weeks and 6 weeks for post-delivery checks   She will go to [facility] for progesterone 200 mg IM every other day starting on 2024 due to history of postpartum depression/anxiety and risk of recurrence.  Breast  Pump DME    Breast Pump Documentation:   Manual/Electric Pump: To support adequate breast milk production and nutrition for infant.     I, the undersigned, certify that the above prescribed supplies are medically necessary for this patient and is both reasonable and necessary in reference to accepted standards of medical and necessary in reference to accepted standards of medical practice in the treatment of this patient's condition and is not prescribed as a convenience.     Home Blood Pressure Cuff Order for DME    DME Documentation:   Describe the reason for need to support medical necessity: hypertension.    I, the undersigned, certify that the above prescribed supplies are medically necessary for this patient and is both reasonable and necessary in reference to accepted standards of medical and necessary in reference to accepted standards of medical practice in the treatment of this patient's condition and is not prescribed as a convenience.     Diet    Resume previous diet     Discharge Medications   Current Discharge Medication List        START taking these medications    Details   acetaminophen (TYLENOL) 325 MG tablet Take 2 tablets (650 mg) by mouth every 4 hours as needed for mild pain or fever    Associated Diagnoses:  (normal spontaneous vaginal delivery)      ibuprofen (ADVIL/MOTRIN) 200 MG tablet Take 4 tablets (800 mg) by mouth every 6 hours as needed for other (cramping)    Associated Diagnoses:  (normal spontaneous vaginal delivery)      labetalol (NORMODYNE) 100 MG tablet Take 1 tablet (100 mg) by mouth every 12 hours  Qty: 60 tablet, Refills: 3    Associated Diagnoses: Gestational hypertension, third trimester           CONTINUE these medications which have NOT CHANGED    Details   DULoxetine (CYMBALTA) 30 MG capsule Take 120 mg by mouth daily      FERROUS GLUCONATE PO Take 10 mg by mouth At Bedtime      levothyroxine (SYNTHROID/LEVOTHROID) 25 MCG tablet Take 25 mcg by mouth daily     "  Prenatal Vit-Fe Fumarate-FA (PRENATAL MULTIVITAMIN  PLUS IRON) 27-1 MG TABS Take 1 tablet by mouth daily      traZODone (DESYREL) 50 MG tablet Take 50 mg by mouth at bedtime           Allergies   Allergies   Allergen Reactions    Chlorhexidine Towelette [Chlorhexidine] Hives    Reglan [Metoclopramide] Unknown     Emotionally \"crawling out of my skin\"    Wheat Bran Other (See Comments) and Itching       Gi upset     "

## 2024-01-30 NOTE — PLAN OF CARE
Problem: Adult Inpatient Plan of Care  Goal: Readiness for Transition of Care  Outcome: Met     Patient assessments and vitals are WDL. Spouse and family are supportive of pt. Birth certificate and PPMA are done - no ROP needed. Mother has been formula feeding infant. Declined breast pump because she is strictly formula feeding and has a blood pressure cuff at home she is using. Weight obtained. Educated patient on medications, worsening symptoms, and follow-up care. Pt verbalized understanding with no further questions.

## 2024-01-30 NOTE — PROGRESS NOTES
"Postpartum Progress Note:  Vaginal delivery  Day 1    SUBJECTIVE: Mary Carlin is a 38 year old female  who   is feeling well.  Pain is well controlled. She is eating, ambulating and voiding without difficulty.  She is breast feeding.  She has no concerns now, but due to history of postpartum depression want progesterone injections ordered after discharge.  She also needs a prescription for labetalol which corrected her gestational hypertension.  Baby is well.    OBJECTIVE:   /85 (BP Location: Right arm, Patient Position: Fowlers, Cuff Size: Adult Regular)   Pulse 91   Temp 97.8  F (36.6  C) (Oral)   Resp 16   Ht 1.727 m (5' 8\")   Wt 105.8 kg (233 lb 3.2 oz)   SpO2 98%   Breastfeeding Unknown   BMI 35.46 kg/m      Alert no apparent distress   Fundus is firm 0 fingerbreadths below umbilicus  Extremities: edema none     Lab Results   Component Value Date    HGB 8.8 (L) 2024         ASSESSMENT:  Gestational hypertension  Postpartum anemia  Condition:  stable    PLAN:    Start oral iron replacement  Continue Labetalol.  Order progesteron injections at clinic.  Home today.  Clinic followup in 1 week.    Jessica Strickland MD  2024 12:27 PM     "

## 2024-01-30 NOTE — PROGRESS NOTES
[facility] RN Care Coordinator Note    Mary Carlin  [id number]  1985    Chart reviewed, discharge follow-up plan discussed with patient's care team, needs assessed. Patient requests all follow-up through clinic, declines home care visit. Out of network for insurance, patient declines self pay option. Post-delivery follow-up appointment with Dr. Han planned in 1 week at [facility].     Patient states she has support at home and would like to discharge today with . RN Care Coordinator will continue to follow and assist if needed with discharge plan. Shayla Fisher RN on 2024 at 12:43 PM

## 2024-01-30 NOTE — PLAN OF CARE
Patient is managing pain with Ibuprofen and Tylenol.   Postpartum assessment WNL. Denies any Pre eclamptic signs and symptoms. Reflexes and clonus WNL. BP's WNL thus far this shift.   Tdap and flu up to date.   Mood assessment completed.  Still needs to complete birth certificate.     Problem: Postpartum (Vaginal Delivery)  Goal: Hemostasis  Outcome: Progressing  Goal: Optimal Pain Control and Function  Outcome: Progressing     Problem: Postpartum (Vaginal Delivery)  Goal: Successful Parent Role Transition  Outcome: Met  Goal: Effective Urinary Elimination  Outcome: Met

## 2024-03-14 ENCOUNTER — TRANSCRIBE ORDERS (OUTPATIENT)
Dept: OTHER | Age: 39
End: 2024-03-14

## 2024-03-14 DIAGNOSIS — N81.6 RECTOCELE: Primary | ICD-10-CM

## 2024-04-12 ENCOUNTER — THERAPY VISIT (OUTPATIENT)
Dept: PHYSICAL THERAPY | Facility: CLINIC | Age: 39
End: 2024-04-12
Attending: FAMILY MEDICINE
Payer: COMMERCIAL

## 2024-04-12 DIAGNOSIS — N81.6 RECTOCELE: ICD-10-CM

## 2024-04-12 DIAGNOSIS — N81.89 PELVIC FLOOR WEAKNESS IN FEMALE: Primary | ICD-10-CM

## 2024-04-12 PROCEDURE — 97162 PT EVAL MOD COMPLEX 30 MIN: CPT | Mod: GP

## 2024-04-12 PROCEDURE — 97110 THERAPEUTIC EXERCISES: CPT | Mod: GP

## 2024-04-12 PROCEDURE — 97112 NEUROMUSCULAR REEDUCATION: CPT | Mod: GP

## 2024-04-12 PROCEDURE — 97530 THERAPEUTIC ACTIVITIES: CPT | Mod: GP

## 2024-04-12 NOTE — PROGRESS NOTES
"PHYSICAL THERAPY EVALUATION  Type of Visit: Evaluation    See electronic medical record for Abuse and Falls Screening details.    Subjective       Presenting condition or subjective complaint:  Mary presents with primary concerns regarding rectocele symptoms. She began noticing these symptoms specifically roughly 3 weeks after her 3rd vaginal delivery on 1/29/24. She states she had felt some bulging or pressure in perineum immediately following delivery but at 3 weeks she developed constipation and noticed tissue protrusion through the vaginal canal. She has been able to reposition the tissue herself without pain but notices symptoms more frequently after having to reposition the tissues. During a typical day (without a BM) she will begin to have prolapse after 2 hours of standing or normal daily activity. Once having to reposition she will have symptoms after roughly 1 hour. She describes these symptoms as \"sandpaper feeling\" and a \"deep ache\" in her abdomen/low back.   Date of onset: 01/29/24    Patient goals for therapy:  Have fewer symptoms and be able to be active with family and exercise as desired.     Pain assessment: See objective evaluation for additional pain details     Objective      PELVIC EVALUATION  ADDITIONAL HISTORY:  Sex assigned at birth: Female  Gender identity: Female    Pronouns: She/Her Hers      Bladder History:  Feels bladder filling: No (minimal progressive sensation to urinate.)  Triggers for feeling of inability to wait to go to the bathroom: No    How long can you wait to urinate: Varies.  Gets up at night to urinate: Yes 1-3 (varies from bladder waking her to her waking with the baby and then urinating)  Can stop the flow of urine when urinating: Yes  Volume of urine usually released: Average   Other issues:    Number of bladder infections in last 12 months:    Fluid intake per day: 70 \"too much\" - unsure of true amount but roughly a whole pot of coffee a day.    Medications taken " "for bladder: No     Activities causing urine leak: Sneeze (maybe 2\x/month when bladder is full and didnt realize it.)    Amount of urine typically leaked: moderate amount - enough to require a clothign change.  Pads used to help with leaking: No        Bowel History:  Frequency of bowel movement: 4-5x/week  Consistency of stool: Soft-formed (History of constipation. Currently consuming roughly 25g of fiber daily.)    Ignores the urge to defecate: No (She expells some mila at almost every toileting. She can feel stool in the prolapse and will splint through vaginal canal to eliminate.)  Other bowel issues: Loss of stool; Loss of gas; Straining to have bowel movement (No control of gas and will leak with walking, stairs, or bed mobility. Loss of stool happens on a weekly frequency and is more common if she has been having difficulty with BM's. Experiences leakage with wipe (stool keeps presenting))  Length of time spent trying to have a bowel movement: 5 - 15 min to have a BM. Historically had to splint at perineum and now has to \"sling\"/support at posterior vaginal wall to have a BM.    Sexual Function History:  Sexual orientation: Straight    Sexually active: Yes  Lubrication used: No No  Pelvic pain: Walking; Standing; Deep penetration (rectal or vaginal); Use of tampon (walking/standing she experiences a deep abdominal ache. With penetration or use of a tampon she has more discomfort and the tampon will need to be pushed back in multiple times.)    Pain or difficulty with orgasms/erection/ejaculation: No    State of menopause: Perimenopause (have not gone through menopause yet)  Hormone medications: No      Are you currently pregnant: No, Number of previous pregnancies: 6, Number of deliveries: 3, If you have delivered before, did you have any of these issues during delivery: Tearing; Vaginal delivery (2nd degree tears with each of her 3 vaginal deliveries. Normal deliveries.), Have you been diagnosed with " pelvic prolapse or abdominal separation: Yes (although when she went to PFPT they clarified that she did not have a CINDY.), Do you get regular exercise: Yes (trying to be active (walking) but get more prolapse and low back pain.), Have you tried pelvic floor strengthening exercises for 4 weeks: No, Do you have any history of trauma that is relevant to your care that you d like to share: No    Discussed reason for referral regarding pelvic health needs and external/internal pelvic floor muscle examination with patient/guardian.  Opportunity provided to ask questions and verbal consent for assessment and intervention was given.    PAIN: In lower abdomen and low back that increases with increased activity levels and the amount of prolapse.   POSTURE: WNL  LUMBAR SCREEN: AROM WNL  HIP SCREEN:  Strength: WNL       PELVIC/SI SCREEN:  Standing Forward Bend: Hypomobile on R. , [place] (March): Hypomobile on R.     PAIN PROVOCATION TEST: WNL  PELVIS/SI SPECIAL TESTS:  Right upslip.   BREATHING SYMMETRY: Decreased rib cage mobility    PELVIC EXAM  External Visual Inspection:  At rest: Distended perineal body  With voluntary pelvic floor contraction: Perineal elevation  Relaxation of PFM: Non-relaxation  With intra-abdominal pressure: Valsalva: Perineal descent, introitus gaping.     Integumentary:   Introitus: Introital gaping, Scar tissue/episiotomy   Anal: Hemorrhoids    External Digital Palpation per Perineum:   Ischiocavernosis: Unremarkable  Bulbo cavernosis: Unremarkable  Transverse perineal: Unremarkable, tenderness on L.   Levator ani: Unremarkable  Perineal body: Unremarkable    Scar:   Location/Type: at 5:30 on L from tearing during delivery.   Mobility: WNL    Internal Digital Palpation:  Per Vagina:  Myofascial Resistance to Palpation: Taut and tender on L. WNL on R.   Digital Muscle Performance: P (Power): 2/5  E (Endurance): 3 seconds.   R (Repetitions): 5 in 10 seconds.   Compensations: none.   Relaxation  Post-Contraction: Normal    Pelvic Organ Prolapse:   Apical: descent of cervix noted with repeated valsalva's. Not visualized.   Posterior: Past the level of the hymen    ABDOMINAL ASSESSMENT  Diastasis Rectus Abdominis (CINDY):  CINDY presence: No    Abdominal Activation/Strength: SLR: reduced weight distribution with SLR B. Minimal palpable TrA contraction    Scar:   Location/Type: from laparoscopic endometriosis procedures and an ovarian surgery in the past.    Mobility: WNL    Fascial Tension/Restriction:  reduced mobility in upper quadrants of abdomen near ribs. WNL in lower quadrants.     Assessment & Plan   CLINICAL IMPRESSIONS  Medical Diagnosis: Rectocele    Treatment Diagnosis: Rectocele. pelvic floor muscle weakness.   Impression/Assessment: Patient is a 38 year old female with rectocele symptoms of low back and abdominal pain, skin irritation, fecal and gas incontinence, and incontinence complaints.  The following significant findings have been identified: Pain, Decreased strength, Decreased proprioception, Impaired muscle performance, and Decreased activity tolerance. These impairments interfere with their ability to perform self care tasks, work tasks, recreational activities, household chores, driving , household mobility, and community mobility as compared to previous level of function.     Clinical Decision Making (Complexity):  Clinical Presentation: Unstable/Unpredictable   Clinical Presentation Rationale: based on medical and personal factors listed in PT evaluation  Clinical Decision Making (Complexity): Moderate complexity    PLAN OF CARE  Treatment Interventions:  Modalities: Biofeedback, E-stim  Interventions: Gait Training, Manual Therapy, Neuromuscular Re-education, Therapeutic Activity, Therapeutic Exercise, Self-Care/Home Management    Long Term Goals     PT Goal 1  Goal Identifier: LTG 1 - Prolapse  Goal Description: Pt will increase time without prolapse symptoms from 2 hours of daily  activity to 4 hours of daily activity prior to symptom onset.  Target Date: 06/21/24  PT Goal 2  Goal Identifier: LTG 2 - pain  Goal Description: Pt will report reduced abdominal and low back pain with daily functional activities in order to improve tolerance to activity and return to active lifestyle.  Target Date: 06/21/24  PT Goal 3  Goal Identifier: LTG 3 - Incontinence  Goal Description: Pt will decrease fecal and gas incontinence to no more than 5% of the time in order to improve continence throughout the day.  Target Date: 06/21/24      Frequency of Treatment: 1x/week  Duration of Treatment: 10 weeks    Education Assessment:   Learner/Method: Patient;No Barriers to Learning    Risks and benefits of evaluation/treatment have been explained.   Patient/Family/caregiver agrees with Plan of Care.     Evaluation Time:     PT Eval, Moderate Complexity Minutes (36813): 45    Signing Clinician: Tiny Tobias PT        [facility]                                                                                   OUTPATIENT PHYSICAL THERAPY    PLAN OF TREATMENT FOR OUTPATIENT REHABILITATION   Patient's Last Name, First Name, Mary Cason YOB: 1985   Provider's Name   [facility]   Medical Record No.  [id number]     Onset Date: 01/29/24  Start of Care Date: 04/12/24     Medical Diagnosis:  Rectocele      PT Treatment Diagnosis:  Rectocele. pelvic floor muscle weakness. Plan of Treatment  Frequency/Duration: 1x/week/ 10 weeks    Certification date from 04/12/24 to 06/21/24         See note for plan of treatment details and functional goals     Tiny Tobias, PT                         I CERTIFY THE NEED FOR THESE SERVICES FURNISHED UNDER        THIS PLAN OF TREATMENT AND WHILE UNDER MY CARE     (Physician attestation of this document indicates review and certification of the therapy plan).              Referring  Provider:  Rebecca Han    Initial Assessment  See Epic Evaluation- Start of Care Date: 04/12/24

## 2024-04-19 ENCOUNTER — THERAPY VISIT (OUTPATIENT)
Dept: PHYSICAL THERAPY | Facility: CLINIC | Age: 39
End: 2024-04-19
Payer: COMMERCIAL

## 2024-04-19 DIAGNOSIS — N81.89 PELVIC FLOOR WEAKNESS IN FEMALE: ICD-10-CM

## 2024-04-19 DIAGNOSIS — N81.6 RECTOCELE: Primary | ICD-10-CM

## 2024-04-19 PROCEDURE — 97110 THERAPEUTIC EXERCISES: CPT | Mod: GP

## 2024-04-19 PROCEDURE — 97112 NEUROMUSCULAR REEDUCATION: CPT | Mod: GP

## 2024-04-30 ENCOUNTER — THERAPY VISIT (OUTPATIENT)
Dept: PHYSICAL THERAPY | Facility: CLINIC | Age: 39
End: 2024-04-30
Payer: COMMERCIAL

## 2024-04-30 DIAGNOSIS — N81.6 RECTOCELE: Primary | ICD-10-CM

## 2024-04-30 DIAGNOSIS — N81.89 PELVIC FLOOR WEAKNESS IN FEMALE: ICD-10-CM

## 2024-04-30 PROCEDURE — 97110 THERAPEUTIC EXERCISES: CPT | Mod: GP

## 2024-04-30 PROCEDURE — 97112 NEUROMUSCULAR REEDUCATION: CPT | Mod: GP

## 2024-04-30 PROCEDURE — 97530 THERAPEUTIC ACTIVITIES: CPT | Mod: GP

## 2024-05-06 ENCOUNTER — THERAPY VISIT (OUTPATIENT)
Dept: PHYSICAL THERAPY | Facility: CLINIC | Age: 39
End: 2024-05-06
Payer: COMMERCIAL

## 2024-05-06 DIAGNOSIS — N81.89 PELVIC FLOOR WEAKNESS IN FEMALE: ICD-10-CM

## 2024-05-06 DIAGNOSIS — N81.6 RECTOCELE: Primary | ICD-10-CM

## 2024-05-06 PROCEDURE — 97112 NEUROMUSCULAR REEDUCATION: CPT | Mod: GP

## 2024-05-06 PROCEDURE — 97530 THERAPEUTIC ACTIVITIES: CPT | Mod: GP

## 2024-05-06 PROCEDURE — 97110 THERAPEUTIC EXERCISES: CPT | Mod: GP

## 2024-05-21 ENCOUNTER — THERAPY VISIT (OUTPATIENT)
Dept: PHYSICAL THERAPY | Facility: CLINIC | Age: 39
End: 2024-05-21
Payer: COMMERCIAL

## 2024-05-21 DIAGNOSIS — N81.89 PELVIC FLOOR WEAKNESS IN FEMALE: ICD-10-CM

## 2024-05-21 DIAGNOSIS — N81.6 RECTOCELE: Primary | ICD-10-CM

## 2024-05-21 PROCEDURE — 97110 THERAPEUTIC EXERCISES: CPT | Mod: GP

## 2024-05-21 PROCEDURE — 97112 NEUROMUSCULAR REEDUCATION: CPT | Mod: GP

## 2024-06-04 ENCOUNTER — THERAPY VISIT (OUTPATIENT)
Dept: PHYSICAL THERAPY | Facility: CLINIC | Age: 39
End: 2024-06-04
Payer: COMMERCIAL

## 2024-06-04 DIAGNOSIS — N81.89 PELVIC FLOOR WEAKNESS IN FEMALE: ICD-10-CM

## 2024-06-04 DIAGNOSIS — N81.6 RECTOCELE: Primary | ICD-10-CM

## 2024-06-04 PROCEDURE — 97110 THERAPEUTIC EXERCISES: CPT | Mod: GP

## 2024-06-04 PROCEDURE — 97112 NEUROMUSCULAR REEDUCATION: CPT | Mod: GP

## 2024-06-11 ENCOUNTER — THERAPY VISIT (OUTPATIENT)
Dept: PHYSICAL THERAPY | Facility: CLINIC | Age: 39
End: 2024-06-11
Payer: COMMERCIAL

## 2024-06-11 DIAGNOSIS — N81.6 RECTOCELE: Primary | ICD-10-CM

## 2024-06-11 DIAGNOSIS — N81.89 PELVIC FLOOR WEAKNESS IN FEMALE: ICD-10-CM

## 2024-06-11 PROCEDURE — 97530 THERAPEUTIC ACTIVITIES: CPT | Mod: GP

## 2024-06-11 PROCEDURE — 97140 MANUAL THERAPY 1/> REGIONS: CPT | Mod: GP

## 2024-06-17 ENCOUNTER — HOSPITAL ENCOUNTER (OUTPATIENT)
Dept: MRI IMAGING | Facility: HOSPITAL | Age: 39
Discharge: HOME | End: 2024-06-17
Attending: PHYSICIAN ASSISTANT | Admitting: PHYSICIAN ASSISTANT
Payer: COMMERCIAL

## 2024-06-17 DIAGNOSIS — G43.109 MIGRAINE HEADACHE WITH AURA: ICD-10-CM

## 2024-06-17 PROCEDURE — 255N000002 HC RX 255 OP 636: Mod: JZ

## 2024-06-17 PROCEDURE — A9585 GADOBUTROL INJECTION: HCPCS | Mod: JZ

## 2024-06-17 PROCEDURE — 70553 MRI BRAIN STEM W/O & W/DYE: CPT

## 2024-06-17 RX ORDER — GADOBUTROL 604.72 MG/ML
0.1 INJECTION INTRAVENOUS ONCE
Status: COMPLETED | OUTPATIENT
Start: 2024-06-17 | End: 2024-06-17

## 2024-06-17 RX ADMIN — GADOBUTROL 9 ML: 604.72 INJECTION INTRAVENOUS at 12:37

## 2024-07-08 ENCOUNTER — THERAPY VISIT (OUTPATIENT)
Dept: PHYSICAL THERAPY | Facility: CLINIC | Age: 39
End: 2024-07-08
Payer: COMMERCIAL

## 2024-07-08 DIAGNOSIS — N81.6 RECTOCELE: Primary | ICD-10-CM

## 2024-07-08 DIAGNOSIS — N81.89 PELVIC FLOOR WEAKNESS IN FEMALE: ICD-10-CM

## 2024-07-08 PROCEDURE — 97530 THERAPEUTIC ACTIVITIES: CPT | Mod: GP

## 2024-07-08 NOTE — PROGRESS NOTES
07/08/24 0500   Appointment Info   Signing clinician's name / credentials Jihan Tobias, PT, DPT   Total/Authorized Visits 10   Visits Used 8   Medical Diagnosis Rectocele   PT Tx Diagnosis Rectocele. pelvic floor muscle weakness.   Quick Adds Pelvic Consent;Certification   Progress Note/Certification   Start of Care Date 04/12/24   Onset of illness/injury or Date of Surgery 01/29/24   Therapy Frequency 1x/week   Predicted Duration 10 weeks   Certification date from 04/12/24   Certification date to 06/21/24   GOALS   PT Goals 3   PT Goal 1   Goal Identifier LTG 1 - Prolapse   Goal Description Pt will increase time without prolapse symptoms from 2 hours of daily activity to 4 hours of daily activity prior to symptom onset.   Goal Progress only 1-2 times in last 3 weeks.   Target Date 06/21/24   Date Met 07/08/24   PT Goal 2   Goal Identifier LTG 2 - pain   Goal Description Pt will report reduced abdominal and low back pain with daily functional activities in order to improve tolerance to activity and return to active lifestyle.   Goal Progress on/off with activity. Redcued intensity or frequency.   Target Date 08/05/24   PT Goal 3   Goal Identifier LTG 3 - Incontinence   Goal Description Pt will decrease fecal and gas incontinence to no more than 5% of the time in order to improve continence throughout the day.   Goal Progress doing better. Control 60% of the time for gas and 95% of the time for stool. Continued streaking and perpetual wiping.   Target Date 08/05/24   Subjective Report   Subjective Report constipation is managed with miralax and stool softener. She does still have some abdominal pain of low intensity. She is having high urge at target and notices increased urge when standing up from work. Able to be very physically active without prolapse symptoms.   Treatment Interventions (PT)   Interventions Therapeutic Procedure/Exercise;Therapeutic Activity;Neuromuscular Re-education;Manual Therapy    Therapeutic Activity   Ther Act 1 Self abdominal massage and releases   Ther Act 1 - Details LLQ and RLQ slow releases to improve tension.   PTRx Ther Act 1 Review of fluid intake and use of ILU massage to improve motility.   PTRx Ther Act 1 - Details recommended increased water intake as much as possible to promote motility.   PTRx Ther Act 2 Breathing for mm lengthening with contract.   PTRx Ther Act 2 - Details cues for smooth contract/relax.   Manual Therapy   Manual Therapy Manual Therapy 2   Manual Therapy 1 Side lying contract - relax - bulge   Manual Therapy 1 - Details with manual external assist for stretch.   Manual Therapy 2 ischiorectal fossa release   Manual Therapy 2 - Details Hookyling with contract - relax - bulge   Skilled Intervention Improve PFM mobility and coordination to promote ease of BM.   Patient Response/Progress Pt noted improved tenderness and lengthening with releases.   Education   Learner/Method Patient;No Barriers to Learning   Plan   Home program Initiated at [facility]. Online access set up.   Plan for next session Progress PF/core strengthening.  Continue to progress PF and core strength and isolation.   Comments   Pelvic Health Informed Consent Statement Discussed with patient/guardian reason for referral regarding pelvic health needs and external/internal pelvic floor muscle examination.  Opportunity provided to ask questions and verbal consent for assessment and intervention was given.         PLAN  Continue therapy per current plan of care for progression of elimination mechanics and urge supression.     Beginning/End Dates of Progress Note Reporting Period:    4/12/24 to 07/08/2024    Referring Provider:  Sheree English        [facility]                                                                                   OUTPATIENT PHYSICAL THERAPY    PLAN OF TREATMENT FOR OUTPATIENT REHABILITATION   Patient's Last Name, First Name,  Mary Cason YOB: 1985   Provider's Name   [facility]   Medical Record No.  [id number]     Onset Date: 01/29/24  Start of Care Date: 04/12/24     Medical Diagnosis:  Rectocele      PT Treatment Diagnosis:  Rectocele. pelvic floor muscle weakness. Plan of Treatment  Frequency/Duration: 1x/week/ (P) 6 more weeks.    Certification date from (P) 06/22/24 to (P) 08/05/24         See note for plan of treatment details and functional goals     Tiny Tobias, PT                         I CERTIFY THE NEED FOR THESE SERVICES FURNISHED UNDER        THIS PLAN OF TREATMENT AND WHILE UNDER MY CARE .             Physician Signature               Date    X_____________________________________________________                  Referring Provider:  Sheree English    Initial Assessment  See Epic Evaluation- Start of Care Date: 04/12/24

## 2024-07-24 ENCOUNTER — THERAPY VISIT (OUTPATIENT)
Dept: PHYSICAL THERAPY | Facility: CLINIC | Age: 39
End: 2024-07-24
Payer: COMMERCIAL

## 2024-07-24 DIAGNOSIS — N81.6 RECTOCELE: Primary | ICD-10-CM

## 2024-07-24 DIAGNOSIS — N81.89 PELVIC FLOOR WEAKNESS IN FEMALE: ICD-10-CM

## 2024-07-24 PROCEDURE — 97530 THERAPEUTIC ACTIVITIES: CPT | Mod: GP

## 2024-07-24 PROCEDURE — 97140 MANUAL THERAPY 1/> REGIONS: CPT | Mod: GP

## 2024-08-08 ENCOUNTER — THERAPY VISIT (OUTPATIENT)
Dept: PHYSICAL THERAPY | Facility: CLINIC | Age: 39
End: 2024-08-08
Payer: COMMERCIAL

## 2024-08-08 DIAGNOSIS — N81.6 RECTOCELE: Primary | ICD-10-CM

## 2024-08-08 DIAGNOSIS — N81.89 PELVIC FLOOR WEAKNESS IN FEMALE: ICD-10-CM

## 2024-08-08 PROCEDURE — 97112 NEUROMUSCULAR REEDUCATION: CPT | Mod: GP

## 2024-08-08 PROCEDURE — 97530 THERAPEUTIC ACTIVITIES: CPT | Mod: GP

## 2024-09-10 ENCOUNTER — THERAPY VISIT (OUTPATIENT)
Dept: PHYSICAL THERAPY | Facility: CLINIC | Age: 39
End: 2024-09-10
Payer: COMMERCIAL

## 2024-09-10 DIAGNOSIS — N81.6 RECTOCELE: Primary | ICD-10-CM

## 2024-09-10 DIAGNOSIS — N81.89 PELVIC FLOOR WEAKNESS IN FEMALE: ICD-10-CM

## 2024-09-10 PROCEDURE — 97112 NEUROMUSCULAR REEDUCATION: CPT | Mod: GP

## 2024-09-10 PROCEDURE — 97530 THERAPEUTIC ACTIVITIES: CPT | Mod: GP

## 2024-09-10 NOTE — PROGRESS NOTES
08/08/24 0500   Appointment Info   Signing clinician's name / credentials Jihan Tobias, PT, DPT   Total/Authorized Visits 10   Visits Used 10   Medical Diagnosis Rectocele   PT Tx Diagnosis Rectocele. pelvic floor muscle weakness.   Progress Note/Certification   Start of Care Date 04/12/24   Onset of illness/injury or Date of Surgery 01/29/24   Therapy Frequency 1x/week   Predicted Duration 10 more weeks   Certification date from 08/06/24   Certification date to 10/15/24   Progress Note Completed Date 07/08/24   GOALS   PT Goals 3   PT Goal 1   Goal Identifier LTG 1 - Prolapse   Goal Description Pt will increase time without prolapse symptoms from 2 hours of daily activity to 4 hours of daily activity prior to symptom onset.   Goal Progress only 1-2 times in last 3 weeks.   Target Date 06/21/24   Date Met 07/08/24   PT Goal 2   Goal Identifier LTG 2 - pain   Goal Description Pt will report reduced abdominal and low back pain with daily functional activities in order to improve tolerance to activity and return to active lifestyle.   Goal Progress on/off with activity. Redcued intensity or frequency.   Target Date 08/05/24   PT Goal 3   Goal Identifier LTG 3 - Incontinence   Goal Description Pt will decrease fecal and gas incontinence to no more than 5% of the time in order to improve continence throughout the day.   Goal Progress doing better. Control 60% of the time for gas and 95% of the time for stool. Continued streaking and perpetual wiping.   Target Date 08/05/24   Subjective Report   Subjective Report She is noticing that her rectocele has had more prolapse (within the last week). She feels this may be due to some constipation (difficult to start and empty fully), hormonal changes, and high activity she had the rectum prolapse to the opening of the vaginal canal. She feels that it's still heavier but back to a slightly more normal position.   Objective Measures   Objective Measures Objective Measure 1    Objective Measure 1   Objective Measure Stool Type   Details type 4 but difficult to pass. thinner stools.   Treatment Interventions (PT)   Interventions Therapeutic Activity   Therapeutic Activity   Therapeutic Activities: dynamic activities to improve functional performance minutes (10567) 20   Ther Act 1 Review of internal rectal evaluation   Ther Act 1 - Details Discussed benefits, goals, and how treatment would be modified following.   PTRx Ther Act 1 hip roll with wide feet   PTRx Ther Act 1 - Details x 10 B   PTRx Ther Act 2 Breathing for mm lengthening with contract.   PTRx Ther Act 2 - Details cues for smooth contract/relax.   PTRx Ther Act 3 Supine pelvic tilt   PTRx Ther Act 3 - Details Cues for breathing and lengthening for pelvic floor.   PTRx Ther Act 4 Answered pt's questions regarding options for reduced strain during BM's.   PTRx Ther Act 4 - Details introduced possibility of outlet dysfunction constipation and how PFPT may help.   Patient Response/Progress Pt engaged in discussion and demonstrated understanding.   Neuromuscular Re-education   Neuromuscular re-ed of mvmt, balance, coord, kinesthetic sense, posture, proprioception minutes (83770) 20   Neuro Re-ed 1 Supine transverse abdominus marching   Neuro Re-ed 1 - Details LE elevated x 15 B   PTRx Neuro Re-ed 1 Bridge   PTRx Neuro Re-ed 1 - Details LE elevated x 15 (PPT 1st)   PTRx Neuro Re-ed 2 SL transverse abdominus march   PTRx Neuro Re-ed 2 - Details ispilateral isometric hold, contralateral march from elevated position x 10 B   Skilled Intervention Progressed and adjusted core stabilization   Patient Response/Progress Pt noted quick fatigue with fewer prolapse symptoms.   Education   Learner/Method Patient;No Barriers to Learning   Plan   Home program Initiated at [facility]. Online access set up.   Plan for next session Internal rectal evaluation. Progress PF/core strengthening.  Continue to progress PF and core strength and isolation.    Comments   Pelvic Health Informed Consent Statement Discussed with patient/guardian reason for referral regarding pelvic health needs and external/internal pelvic floor muscle examination.  Opportunity provided to ask questions and verbal consent for assessment and intervention was given.   Total Session Time   Timed Code Treatment Minutes 40   Total Treatment Time (sum of timed and untimed services) 40         [facility]                                                                                   OUTPATIENT PHYSICAL THERAPY    PLAN OF TREATMENT FOR OUTPATIENT REHABILITATION   Patient's Last Name, First Name, Mary Cason YOB: 1985   Provider's Name   [facility]   Medical Record No.  [id number]     Onset Date: 01/29/24  Start of Care Date: 04/12/24     Medical Diagnosis:  Rectocele      PT Treatment Diagnosis:  Rectocele. pelvic floor muscle weakness. Plan of Treatment  Frequency/Duration: 1x/week/ (P) 10 more weeks    Certification date from (P) 08/06/24 to (P) 10/15/24         See note for plan of treatment details and functional goals     Tiny Tobias, PT                         I CERTIFY THE NEED FOR THESE SERVICES FURNISHED UNDER        THIS PLAN OF TREATMENT AND WHILE UNDER MY CARE .             Physician Signature               Date    X_____________________________________________________                  Referring Provider:  Sheree English    Initial Assessment  See Epic Evaluation- Start of Care Date: 04/12/24            PLAN  Continue therapy per current plan of care.    Beginning/End Dates of Progress Note Reporting Period:  (P) 07/08/24 to 08/08/2024    Referring Provider:  Sheree English

## 2024-09-29 ENCOUNTER — HEALTH MAINTENANCE LETTER (OUTPATIENT)
Age: 39
End: 2024-09-29

## 2025-02-24 NOTE — PLAN OF CARE
Problem: Adult Inpatient Plan of Care  Goal: Plan of Care Review  Description: The Plan of Care Review/Shift note should be completed every shift.  The Outcome Evaluation is a brief statement about your assessment that the patient is improving, declining, or no change.  This information will be displayed automatically on your shift  note.  Outcome: Progressing  Flowsheets (Taken 1/29/2024 1333)  Plan of Care Reviewed With: patient   Goal Outcome Evaluation:      Plan of Care Reviewed With: patient           VSS with exception of elevated BP this /91-MD notified and started labetalol BID, 151/85 later in AM, and afternoon BP /70. Pt denies any symptoms of HTN.  Postpartum assessment WNL:  Fundus firm, -2U  Lochia light  2nd degree laceration, sore, PRN ibuprofen and tylenol, using tucks, dermaplast, ice and alla bottle.  Voiding         Seen by Dr. De Paz   To trial 1liter LFNC during day, 2liter LFNC at night  Would need HFNC with illness  To keep saturations >95%

## (undated) DEVICE — DRSG TELFA 3X8" 1238

## (undated) DEVICE — DRAPE U SPLIT 74X120" 29440

## (undated) DEVICE — DECANTER BAG 2002S

## (undated) DEVICE — DAVINCI XI SUCTION IRRIGATOR ENDOWRIST 480299

## (undated) DEVICE — SU VICRYL+ 0 27 UR6 VLT VCP603H

## (undated) DEVICE — Device

## (undated) DEVICE — DRAPE IOBAN INCISE 23X17" 6650EZ

## (undated) DEVICE — PREP POVIDONE IODINE SOLUTION 10% 4OZ BOTTLE 29906-004

## (undated) DEVICE — SU WND CLOSURE VLOC 180 ABS 4-0 6" CV-23 VLOCL0803

## (undated) DEVICE — UTERINE MANIPULATOR RUMI 6.7MMX6CM UMW676

## (undated) DEVICE — DAVINCI XI DRAPE COLUMN 470341

## (undated) DEVICE — DRSG TELFA 3X4" 1050

## (undated) DEVICE — SYR 50ML LL W/O NDL 309653

## (undated) DEVICE — MAT FLOOR SURGICAL 40X38 0702140238

## (undated) DEVICE — SYSTEM LAPAROVUE VISIBILITY LAPVUE10

## (undated) DEVICE — ENDO OBTURATOR ACCESS PORT BLADELESS 8X100MM IAS8-100LP

## (undated) DEVICE — DAVINCI XI OBTURATOR BLADELESS 8MM 470359

## (undated) DEVICE — TUBING FILTER TRI-LUMEN AIRSEAL ASC-EVAC1

## (undated) DEVICE — SYR 10ML LL W/O NDL 302995

## (undated) DEVICE — SOL WATER IRRIG 1000ML BOTTLE 2F7114

## (undated) DEVICE — DRAPE C-ARM 60X42" 1013

## (undated) DEVICE — ADAPTER DRAPE ALLY AU-AD

## (undated) DEVICE — SEAL SET MYOSURE ROD LENS SCOPE SINGLE USE 40-902

## (undated) DEVICE — LUBRICANT INST ELECTROLUBE EL101

## (undated) DEVICE — GLOVE UNDER INDICATOR PI SZ 7.0 LF 41670

## (undated) DEVICE — CUSTOM PACK DA VINCI GYN SMA5BDVHEA

## (undated) DEVICE — BARRIER SEPRAFILM 5X6" SINGLE SHEET 4301-02

## (undated) DEVICE — CUSTOM PACK PERI GYN SMA5BPGHEA

## (undated) DEVICE — SYR INFLATION DEVICE CID-20-30

## (undated) DEVICE — DRAPE POUCH INSTRUMENT 3 POCKET 1018L

## (undated) DEVICE — SYR 50ML CATH TIP W/O NDL 309620

## (undated) DEVICE — CURETTE SUCTION PIPELLE ENDOMETRIAL 3.1MMX23.5CM  8200

## (undated) DEVICE — TRENGUARD 450 PROCEDURAL PACK 111404

## (undated) DEVICE — DRSG TEGADERM 2 3/8X2 3/4" 1624W

## (undated) DEVICE — ENDO POUCH UNIVERSAL RETRIEVAL SYSTEM INZII 5MM CD003

## (undated) DEVICE — GOWN IMPERVIOUS BREATHABLE SMART LG 89015

## (undated) DEVICE — RAD INFLATOR BASIC COMPAK  IN4130

## (undated) DEVICE — BLADE KNIFE SURG 15 371115

## (undated) DEVICE — CATH FOLEY 16FR 5ML LUBRICATH LATEX 0165L16

## (undated) DEVICE — SUTURE VICRYL+ 4-0 UNDYED PS-2 VCP496H

## (undated) DEVICE — DAVINCI HOT SHEARS TIP COVER  400180

## (undated) DEVICE — PAD POS XL 1X20X40IN PINK PIGAZZI

## (undated) DEVICE — BRIEF STRETCH XL MPS40

## (undated) DEVICE — SYR 50ML SLIP TIP W/O NDL 309654

## (undated) DEVICE — PREP POVIDONE-IODINE 7.5% SCRUB 4OZ BOTTLE MDS093945

## (undated) DEVICE — SU PDS II 2-0 SH 27" Z317H

## (undated) DEVICE — KIT PROCEDURE FLUENT IN/OUT FLOWPAK TISS TRAP FLT-112S

## (undated) DEVICE — SU VICRYL+ 0 TIES 18IN UND VCP112G

## (undated) DEVICE — PREP DURAPREP 26ML APL 8630

## (undated) DEVICE — DAVINCI XI DRAPE ARM 470015

## (undated) DEVICE — SOLUTION IRRIG 2B7127 .9NS 3000ML BAG

## (undated) DEVICE — DRSG GAUZE 2X2" 8042

## (undated) DEVICE — DAVINCI XI SEAL UNIVERSAL 5-8MM 470361

## (undated) DEVICE — PROTECTOR ARM STANDARD ONE STEP

## (undated) DEVICE — LEGGINGS 31X48" LF KM89408

## (undated) DEVICE — TUBING LAP SUCT/IRRIG STRYKER 250070500

## (undated) DEVICE — GOWN SURGICAL SMARTGOWN 2XL 89075

## (undated) RX ORDER — DEXAMETHASONE SODIUM PHOSPHATE 10 MG/ML
INJECTION, SOLUTION INTRAMUSCULAR; INTRAVENOUS
Status: DISPENSED
Start: 2022-12-08

## (undated) RX ORDER — FENTANYL CITRATE-0.9 % NACL/PF 10 MCG/ML
PLASTIC BAG, INJECTION (ML) INTRAVENOUS
Status: DISPENSED
Start: 2022-12-08

## (undated) RX ORDER — LIDOCAINE HYDROCHLORIDE 10 MG/ML
INJECTION, SOLUTION EPIDURAL; INFILTRATION; INTRACAUDAL; PERINEURAL
Status: DISPENSED
Start: 2022-12-08

## (undated) RX ORDER — FENTANYL CITRATE 50 UG/ML
INJECTION, SOLUTION INTRAMUSCULAR; INTRAVENOUS
Status: DISPENSED
Start: 2022-12-08

## (undated) RX ORDER — PROPOFOL 10 MG/ML
INJECTION, EMULSION INTRAVENOUS
Status: DISPENSED
Start: 2022-12-08

## (undated) RX ORDER — ONDANSETRON 2 MG/ML
INJECTION INTRAMUSCULAR; INTRAVENOUS
Status: DISPENSED
Start: 2022-12-08

## (undated) RX ORDER — VASOPRESSIN 20 U/ML
INJECTION PARENTERAL
Status: DISPENSED
Start: 2022-12-08